# Patient Record
Sex: FEMALE | Race: WHITE | NOT HISPANIC OR LATINO | Employment: UNEMPLOYED | URBAN - METROPOLITAN AREA
[De-identification: names, ages, dates, MRNs, and addresses within clinical notes are randomized per-mention and may not be internally consistent; named-entity substitution may affect disease eponyms.]

---

## 2023-08-14 ENCOUNTER — ROUTINE PRENATAL (OUTPATIENT)
Dept: OBSTETRICS AND GYNECOLOGY | Facility: CLINIC | Age: 18
End: 2023-08-14
Payer: COMMERCIAL

## 2023-08-14 VITALS — WEIGHT: 118.6 LBS | SYSTOLIC BLOOD PRESSURE: 121 MMHG | DIASTOLIC BLOOD PRESSURE: 73 MMHG

## 2023-08-14 DIAGNOSIS — Z34.02 ENCOUNTER FOR SUPERVISION OF NORMAL FIRST PREGNANCY IN SECOND TRIMESTER: Primary | ICD-10-CM

## 2023-08-14 DIAGNOSIS — Z36.86 ENCOUNTER FOR ANTENATAL SCREENING FOR CERVICAL LENGTH: ICD-10-CM

## 2023-08-14 DIAGNOSIS — Z36.89 ENCOUNTER FOR FETAL ANATOMIC SURVEY: ICD-10-CM

## 2023-08-14 DIAGNOSIS — Z36.1 ENCOUNTER FOR ANTENATAL SCREENING FOR HIGH ALPHA-FETOPROTEIN LEVEL: ICD-10-CM

## 2023-08-14 RX ORDER — AMOXICILLIN 875 MG/1
875 TABLET, COATED ORAL
COMMUNITY
Start: 2023-08-11 | End: 2023-08-21

## 2023-08-14 RX ORDER — ACETAMINOPHEN 500 MG
1000 TABLET ORAL
COMMUNITY
Start: 2023-03-17 | End: 2024-03-16

## 2023-08-14 NOTE — PROGRESS NOTES
Chief Complaint   Patient presents with    Routine Prenatal Visit     HPI- Pt is 17 y.o.  at 19w4d here for prenatal visit.  Patient has no complaints today.  She is starting to feel fetal movement.    ROS-     - No vaginal bleeding    GI- No abdominal pain    /73   Wt 53.8 kg (118 lb 9.6 oz)   LMP 2023   Exam - See flow sheet    Fetal heart rate is normal    Assessment-  Diagnoses and all orders for this visit:    Encounter for supervision of normal first pregnancy in second trimester    Encounter for  screening for high alpha-fetoprotein level  -     Alpha Fetoprotein, Maternal    Encounter for fetal anatomic survey  -     US Ob 14 + Weeks Single or First Gestation; Future    Encounter for  screening for cervical length  -     US Ob Transvaginal; Future    Other orders  -     amoxicillin (AMOXIL) 875 MG tablet; Take 1 tablet by mouth.  -     acetaminophen (TYLENOL) 500 MG tablet; Take 2 tablets by mouth.    Patient was offered screening for spina bifida and desires.  She will follow-up next week with anatomy ultrasound and see me back in 4 weeks.

## 2023-08-16 LAB
AFP INTERP SERPL-IMP: NORMAL
AFP INTERP SERPL-IMP: NORMAL
AFP MOM SERPL: 0.66
AFP SERPL-MCNC: 41.6 NG/ML
AGE AT DELIVERY: 20.2 YR
GA METHOD: NORMAL
GA: 19.4 WEEKS
IDDM PATIENT QL: NORMAL
LABORATORY COMMENT REPORT: NORMAL
MULTIPLE PREGNANCY: NORMAL
NEURAL TUBE DEFECT RISK FETUS: NORMAL %
RESULT: NORMAL

## 2023-09-12 ENCOUNTER — REFERRAL TRIAGE (OUTPATIENT)
Dept: LABOR AND DELIVERY | Facility: HOSPITAL | Age: 18
End: 2023-09-12
Payer: COMMERCIAL

## 2023-09-13 ENCOUNTER — ROUTINE PRENATAL (OUTPATIENT)
Dept: OBSTETRICS AND GYNECOLOGY | Facility: CLINIC | Age: 18
End: 2023-09-13
Payer: COMMERCIAL

## 2023-09-13 VITALS — SYSTOLIC BLOOD PRESSURE: 113 MMHG | DIASTOLIC BLOOD PRESSURE: 69 MMHG | WEIGHT: 125.4 LBS

## 2023-09-13 DIAGNOSIS — Z34.02 ENCOUNTER FOR SUPERVISION OF NORMAL FIRST PREGNANCY IN SECOND TRIMESTER: Primary | ICD-10-CM

## 2023-09-13 DIAGNOSIS — Z13.0 SCREENING FOR IRON DEFICIENCY ANEMIA: ICD-10-CM

## 2023-09-13 DIAGNOSIS — Z13.1 SCREENING FOR DIABETES MELLITUS: ICD-10-CM

## 2023-09-13 DIAGNOSIS — Z3A.23 23 WEEKS GESTATION OF PREGNANCY: ICD-10-CM

## 2023-09-13 NOTE — PROGRESS NOTES
Chief Complaint   Patient presents with    Routine Prenatal Visit     HPI- Pt is 17 y.o.  at 23w6d here for prenatal visit.  She is doing well with no complaints today.  She is feeling lots of fetal movement.    ROS-     - No vaginal bleeding    GI- No abdominal pain    /69   Wt 56.9 kg (125 lb 6.4 oz)   LMP 2023   Exam - See flow sheet    Fetal heart rate is normal    Assessment-  Diagnoses and all orders for this visit:    Encounter for supervision of normal first pregnancy in second trimester    Screening for diabetes mellitus  -     Gestational Screen 1 Hr ([facility])    Screening for iron deficiency anemia  -     CBC (No Diff)    23 weeks gestation of pregnancy    I discussed prenatal classes and hospital tours.  Discussed the glucose test with her next visit and instructions given.  She will follow-up in 4 weeks.

## 2023-09-14 ENCOUNTER — PATIENT OUTREACH (OUTPATIENT)
Dept: LABOR AND DELIVERY | Facility: HOSPITAL | Age: 18
End: 2023-09-14
Payer: COMMERCIAL

## 2023-09-14 NOTE — OUTREACH NOTE
[facility]  Enrollment    Current Estimated Gestational Age: 24w0d    Questions/Answers      Flowsheet Row Responses   Would like to participate? Yes   Date of Intake Visit 09/20/23          Pt interested but at work currently. Asked for intake to be completed next Wed on her off day.     Airam Celeste RN  Maternity Nurse Navigator    9/14/2023, 11:36 EDT

## 2023-09-20 ENCOUNTER — PATIENT OUTREACH (OUTPATIENT)
Dept: LABOR AND DELIVERY | Facility: HOSPITAL | Age: 18
End: 2023-09-20
Payer: COMMERCIAL

## 2023-09-20 NOTE — OUTREACH NOTE
[facility]  Unable to Reach       Questions/Answers      Flowsheet Row Responses   Pending Outreach Intake Visit   Call Attempt Second   Outcome Left message   Next Call Attempt Date 09/22/23            Airam WHITE - RN  Maternity Nurse Navigator    9/20/2023, 12:46 EDT

## 2023-09-22 ENCOUNTER — PATIENT OUTREACH (OUTPATIENT)
Dept: LABOR AND DELIVERY | Facility: HOSPITAL | Age: 18
End: 2023-09-22
Payer: COMMERCIAL

## 2023-09-22 NOTE — OUTREACH NOTE
[facility]  Unable to Reach       Questions/Answers      Flowsheet Row Responses   Pending Outreach Intake Visit   Call Attempt Third   Outcome Left message   Next Call Attempt Date 09/27/23            Airam WHITE - RN  Maternity Nurse Navigator    9/22/2023, 11:25 EDT

## 2023-09-23 ENCOUNTER — TELEPHONE (OUTPATIENT)
Dept: OBSTETRICS AND GYNECOLOGY | Facility: CLINIC | Age: 18
End: 2023-09-23
Payer: COMMERCIAL

## 2023-09-23 NOTE — TELEPHONE ENCOUNTER
Patient called answering service to speak with physician on call.  She reports rib pain for one week.  She did not seek advice from her primary OB.  She uses Tylenol.  Yesterday, she had some vomiting and reports pain worse afterward.  Baby is moving good.  No fevers or chills.  No bleeding.  No cramping.  Advised observation unless bleeding, cramping or progressive pain.

## 2023-09-27 ENCOUNTER — PATIENT OUTREACH (OUTPATIENT)
Dept: LABOR AND DELIVERY | Facility: HOSPITAL | Age: 18
End: 2023-09-27
Payer: COMMERCIAL

## 2023-09-27 NOTE — OUTREACH NOTE
[facility]  Unable to Reach       Questions/Answers      Flowsheet Row Responses   Pending Outreach Intake Visit   Call Attempt Third   Outcome Left message   Next Call Attempt Date 09/29/23            Airam WHITE - RN  Maternity Nurse Navigator    9/27/2023, 11:15 EDT

## 2023-09-29 ENCOUNTER — PATIENT MESSAGE (OUTPATIENT)
Dept: OBSTETRICS AND GYNECOLOGY | Facility: CLINIC | Age: 18
End: 2023-09-29
Payer: COMMERCIAL

## 2023-09-29 ENCOUNTER — PATIENT OUTREACH (OUTPATIENT)
Dept: LABOR AND DELIVERY | Facility: HOSPITAL | Age: 18
End: 2023-09-29
Payer: COMMERCIAL

## 2023-09-29 NOTE — OUTREACH NOTE
[facility]  Unable to Reach       Questions/Answers      Flowsheet Row Responses   Pending Outreach Intake Visit   Outcome Left message, MyChart message sent to patient            Airam S Roula RN  Maternity Nurse Navigator    9/29/2023, 11:38 EDT

## 2023-10-02 NOTE — TELEPHONE ENCOUNTER
Left message for patient to call office. We need the specific days she was off that she is needing a note for work. If multiple days, patient may consider FMLA per Dr. Mandujano.

## 2023-10-09 ENCOUNTER — TELEPHONE (OUTPATIENT)
Dept: OBSTETRICS AND GYNECOLOGY | Facility: CLINIC | Age: 18
End: 2023-10-09
Payer: COMMERCIAL

## 2023-10-09 NOTE — TELEPHONE ENCOUNTER
Pt called says she won't be able to come to her appt tomorrow and requested if she can be seen on Friday 20th. I did see your completely booked, is there another day you can see her?

## 2023-10-09 NOTE — TELEPHONE ENCOUNTER
You may schedule her with me the next week, after the 20th, but we are unable to see her on the 20th.

## 2023-10-27 ENCOUNTER — ROUTINE PRENATAL (OUTPATIENT)
Dept: OBSTETRICS AND GYNECOLOGY | Facility: CLINIC | Age: 18
End: 2023-10-27
Payer: COMMERCIAL

## 2023-10-27 VITALS — WEIGHT: 134.4 LBS | DIASTOLIC BLOOD PRESSURE: 70 MMHG | SYSTOLIC BLOOD PRESSURE: 112 MMHG

## 2023-10-27 DIAGNOSIS — O26.893 RIGHT UPPER QUADRANT ABDOMINAL PAIN AFFECTING PREGNANCY IN THIRD TRIMESTER: Primary | ICD-10-CM

## 2023-10-27 DIAGNOSIS — R10.11 RIGHT UPPER QUADRANT ABDOMINAL PAIN AFFECTING PREGNANCY IN THIRD TRIMESTER: Primary | ICD-10-CM

## 2023-10-27 DIAGNOSIS — Z3A.30 30 WEEKS GESTATION OF PREGNANCY: ICD-10-CM

## 2023-10-27 RX ORDER — FLUTICASONE PROPIONATE 50 MCG
1 SPRAY, SUSPENSION (ML) NASAL DAILY
COMMUNITY
Start: 2023-09-30 | End: 2023-10-27

## 2023-10-27 RX ORDER — CETIRIZINE HYDROCHLORIDE 10 MG/1
10 TABLET ORAL DAILY
COMMUNITY
Start: 2023-09-30 | End: 2023-10-27

## 2023-10-27 NOTE — PROGRESS NOTES
Chief Complaint   Patient presents with    Routine Prenatal Visit     HPI- Pt is 18 y.o.  at 30w1d here for prenatal visit.  She overall has been feeling well, but does report right upper quadrant pain that she does notice with eating.  She denies any previous history of gallbladder disease.  She does report good fetal movement.    ROS-     - No vaginal bleeding    GI- No abdominal pain    /70   Wt 61 kg (134 lb 6.4 oz)   LMP 2023   Exam - See flow sheet    Fetal heart rate is normal    Assessment-  Diagnoses and all orders for this visit:    Right upper quadrant abdominal pain affecting pregnancy in third trimester  -     Comprehensive Metabolic Panel  -     Amylase  -     Lipase  -     US Ob Follow Up Transabdominal Approach; Future    30 weeks gestation of pregnancy    Other orders  -     Discontinue: fluticasone (FLONASE) 50 MCG/ACT nasal spray; 1 spray into the nostril(s) as directed by provider Daily.  -     Discontinue: cetirizine (zyrTEC) 10 MG tablet; Take 1 tablet by mouth Daily.      She is doing her glucose test today along with CBC.  We will add on CMP and amylase and lipase due to complaints of right upper quadrant pain with eating.  I did discuss with patient that there is a higher incidence of gallbladder disease in pregnancy and recommend avoiding high-fat meals.  She will follow-up in 2 weeks with growth ultrasound.

## 2023-10-28 LAB
ALBUMIN SERPL-MCNC: 3.5 G/DL (ref 3.5–5.2)
ALBUMIN/GLOB SERPL: 1.8 G/DL
ALP SERPL-CCNC: 79 U/L (ref 39–117)
ALT SERPL-CCNC: 12 U/L (ref 1–33)
AMYLASE SERPL-CCNC: 35 U/L (ref 28–100)
AST SERPL-CCNC: 13 U/L (ref 1–32)
BILIRUB SERPL-MCNC: 0.3 MG/DL (ref 0–1.2)
BUN SERPL-MCNC: 4 MG/DL (ref 6–20)
BUN/CREAT SERPL: 9.1 (ref 7–25)
CALCIUM SERPL-MCNC: 8.1 MG/DL (ref 8.6–10.5)
CHLORIDE SERPL-SCNC: 105 MMOL/L (ref 98–107)
CO2 SERPL-SCNC: 23.8 MMOL/L (ref 22–29)
CREAT SERPL-MCNC: 0.44 MG/DL (ref 0.57–1)
EGFRCR SERPLBLD CKD-EPI 2021: 142.2 ML/MIN/1.73
ERYTHROCYTE [DISTWIDTH] IN BLOOD BY AUTOMATED COUNT: 11.5 % (ref 12.3–15.4)
GLOBULIN SER CALC-MCNC: 1.9 GM/DL
GLUCOSE 1H P 50 G GLC PO SERPL-MCNC: 79 MG/DL (ref 65–139)
GLUCOSE SERPL-MCNC: 88 MG/DL (ref 65–99)
HCT VFR BLD AUTO: 32.9 % (ref 34–46.6)
HGB BLD-MCNC: 10.8 G/DL (ref 12–15.9)
LIPASE SERPL-CCNC: 16 U/L (ref 13–60)
MCH RBC QN AUTO: 27.9 PG (ref 26.6–33)
MCHC RBC AUTO-ENTMCNC: 32.8 G/DL (ref 31.5–35.7)
MCV RBC AUTO: 85 FL (ref 79–97)
PLATELET # BLD AUTO: 223 10*3/MM3 (ref 140–450)
POTASSIUM SERPL-SCNC: 3.6 MMOL/L (ref 3.5–5.2)
PROT SERPL-MCNC: 5.4 G/DL (ref 6–8.5)
RBC # BLD AUTO: 3.87 10*6/MM3 (ref 3.77–5.28)
SODIUM SERPL-SCNC: 138 MMOL/L (ref 136–145)
WBC # BLD AUTO: 7.73 10*3/MM3 (ref 3.4–10.8)

## 2023-10-30 PROBLEM — O99.019 MATERNAL ANEMIA IN PREGNANCY, ANTEPARTUM: Status: ACTIVE | Noted: 2023-10-30

## 2023-10-30 RX ORDER — FERROUS SULFATE 325(65) MG
325 TABLET ORAL
Qty: 90 TABLET | Refills: 1 | Status: SHIPPED | OUTPATIENT
Start: 2023-10-30 | End: 2023-11-02 | Stop reason: SDUPTHER

## 2023-11-02 ENCOUNTER — TELEPHONE (OUTPATIENT)
Dept: OBSTETRICS AND GYNECOLOGY | Facility: CLINIC | Age: 18
End: 2023-11-02
Payer: COMMERCIAL

## 2023-11-02 RX ORDER — FERROUS SULFATE 325(65) MG
325 TABLET ORAL
Qty: 90 TABLET | Refills: 1 | Status: SHIPPED | OUTPATIENT
Start: 2023-11-02

## 2023-11-02 NOTE — TELEPHONE ENCOUNTER
Nazia pt- calling says she wants her Rx sent over to [facility] instead, that is now updated in her pharmacy. Please Advise.

## 2023-11-15 ENCOUNTER — ROUTINE PRENATAL (OUTPATIENT)
Dept: OBSTETRICS AND GYNECOLOGY | Facility: CLINIC | Age: 18
End: 2023-11-15
Payer: COMMERCIAL

## 2023-11-15 VITALS — WEIGHT: 138.8 LBS | DIASTOLIC BLOOD PRESSURE: 68 MMHG | SYSTOLIC BLOOD PRESSURE: 103 MMHG

## 2023-11-15 DIAGNOSIS — O99.019 MATERNAL ANEMIA IN PREGNANCY, ANTEPARTUM: Primary | ICD-10-CM

## 2023-11-15 DIAGNOSIS — Z3A.32 32 WEEKS GESTATION OF PREGNANCY: ICD-10-CM

## 2023-11-15 PROBLEM — Z86.19 HISTORY OF HERPES GENITALIS: Status: ACTIVE | Noted: 2023-11-15

## 2023-11-15 NOTE — PROGRESS NOTES
Chief Complaint   Patient presents with    Routine Prenatal Visit     HPI- Pt is 18 y.o.  at 32w6d here for prenatal visit.  She is doing well today and has no major complaints.  She does report good fetal movement.    ROS-     - No vaginal bleeding    GI- No abdominal pain    /68   Wt 63 kg (138 lb 12.8 oz)   LMP 2023   Exam - See flow sheet    Fetal heart rate is normal    Assessment-  Diagnoses and all orders for this visit:    Maternal anemia in pregnancy, antepartum    32 weeks gestation of pregnancy    Growth ultrasound was performed and shows appropriate fetal growth.  Ultrasound was reviewed with her.  She will follow-up in 2 weeks.

## 2023-11-16 ENCOUNTER — TELEPHONE (OUTPATIENT)
Dept: OBSTETRICS AND GYNECOLOGY | Facility: CLINIC | Age: 18
End: 2023-11-16
Payer: COMMERCIAL

## 2023-11-16 NOTE — TELEPHONE ENCOUNTER
States she was sick and started taking iron on 11/2/23. She was lightheaded and dizzy. She feels better now but needs a note for those days. Thank you

## 2023-11-16 NOTE — TELEPHONE ENCOUNTER
MANJU quevedo. Nazia pt 33 wks. OB 12/1/23. Seen 11/15/23 OB with US. Needing work note for 11/15/23 and the 8th, 9th and 10th. Please advise if ok for notes. Thank you.

## 2023-11-16 NOTE — TELEPHONE ENCOUNTER
Caller: Mirta Pierce    Relationship: Self    Best call back number: [phone]      Who are you requesting to speak with (clinical staff, DR. RUANO      What was the call regarding: PATIENT ADVISED THAT SHE WOULD NEED A WORK NOTE FOR 11/15/23.  PATIENT ADVISED THAT SHE CALLED OFF FROM WORK ON THE  8TH, 9TH AND 10TH AND WOULD  LIKE A NOTE FOR THOSE DAYS AS WELL. SHE WILL  THE DRS. NOTES.

## 2023-11-20 ENCOUNTER — HOSPITAL ENCOUNTER (EMERGENCY)
Facility: HOSPITAL | Age: 18
Discharge: HOME | End: 2023-11-20
Attending: OBSTETRICS & GYNECOLOGY | Admitting: OBSTETRICS & GYNECOLOGY
Payer: COMMERCIAL

## 2023-11-20 VITALS
TEMPERATURE: 97.9 F | HEART RATE: 80 BPM | RESPIRATION RATE: 18 BRPM | OXYGEN SATURATION: 100 % | WEIGHT: 137 LBS | BODY MASS INDEX: 25.21 KG/M2 | SYSTOLIC BLOOD PRESSURE: 95 MMHG | DIASTOLIC BLOOD PRESSURE: 53 MMHG | HEIGHT: 62 IN

## 2023-11-20 LAB
ALBUMIN SERPL-MCNC: 3.5 G/DL (ref 3.5–5.2)
ALBUMIN/GLOB SERPL: 1.5 G/DL
ALP SERPL-CCNC: 109 U/L (ref 39–117)
ALT SERPL W P-5'-P-CCNC: 10 U/L (ref 1–33)
ANION GAP SERPL CALCULATED.3IONS-SCNC: 12.5 MMOL/L (ref 5–15)
AST SERPL-CCNC: 17 U/L (ref 1–32)
BACTERIA UR QL AUTO: ABNORMAL /HPF
BILIRUB SERPL-MCNC: 0.4 MG/DL (ref 0–1.2)
BILIRUB UR QL STRIP: NEGATIVE
BUN SERPL-MCNC: 5 MG/DL (ref 6–20)
BUN/CREAT SERPL: 12.2 (ref 7–25)
CALCIUM SPEC-SCNC: 8.2 MG/DL (ref 8.6–10.5)
CHLORIDE SERPL-SCNC: 106 MMOL/L (ref 98–107)
CLARITY UR: ABNORMAL
CO2 SERPL-SCNC: 18.5 MMOL/L (ref 22–29)
COLOR UR: YELLOW
CREAT SERPL-MCNC: 0.41 MG/DL (ref 0.57–1)
EGFRCR SERPLBLD CKD-EPI 2021: 144.7 ML/MIN/1.73
GLOBULIN UR ELPH-MCNC: 2.4 GM/DL
GLUCOSE SERPL-MCNC: 80 MG/DL (ref 65–99)
GLUCOSE UR STRIP-MCNC: NEGATIVE MG/DL
HGB UR QL STRIP.AUTO: NEGATIVE
HYALINE CASTS UR QL AUTO: ABNORMAL /LPF
KETONES UR QL STRIP: ABNORMAL
LEUKOCYTE ESTERASE UR QL STRIP.AUTO: ABNORMAL
NITRITE UR QL STRIP: NEGATIVE
PH UR STRIP.AUTO: 6.5 [PH] (ref 5–8)
POTASSIUM SERPL-SCNC: 3.5 MMOL/L (ref 3.5–5.2)
PROT SERPL-MCNC: 5.9 G/DL (ref 6–8.5)
PROT UR QL STRIP: NEGATIVE
RBC # UR STRIP: ABNORMAL /HPF
REF LAB TEST METHOD: ABNORMAL
SODIUM SERPL-SCNC: 137 MMOL/L (ref 136–145)
SP GR UR STRIP: 1.02 (ref 1–1.03)
SQUAMOUS #/AREA URNS HPF: ABNORMAL /HPF
UROBILINOGEN UR QL STRIP: ABNORMAL
WBC # UR STRIP: ABNORMAL /HPF

## 2023-11-20 PROCEDURE — 96372 THER/PROPH/DIAG INJ SC/IM: CPT | Performed by: OBSTETRICS & GYNECOLOGY

## 2023-11-20 PROCEDURE — 25010000002 TERBUTALINE PER 1 MG: Performed by: OBSTETRICS & GYNECOLOGY

## 2023-11-20 PROCEDURE — 25810000003 LACTATED RINGERS SOLUTION: Performed by: OBSTETRICS & GYNECOLOGY

## 2023-11-20 PROCEDURE — 59025 FETAL NON-STRESS TEST: CPT

## 2023-11-20 PROCEDURE — 80053 COMPREHEN METABOLIC PANEL: CPT | Performed by: OBSTETRICS & GYNECOLOGY

## 2023-11-20 PROCEDURE — 81001 URINALYSIS AUTO W/SCOPE: CPT | Performed by: OBSTETRICS & GYNECOLOGY

## 2023-11-20 PROCEDURE — 87086 URINE CULTURE/COLONY COUNT: CPT | Performed by: OBSTETRICS & GYNECOLOGY

## 2023-11-20 PROCEDURE — 99284 EMERGENCY DEPT VISIT MOD MDM: CPT | Performed by: OBSTETRICS & GYNECOLOGY

## 2023-11-20 RX ORDER — TERBUTALINE SULFATE 1 MG/ML
0.25 INJECTION, SOLUTION SUBCUTANEOUS ONCE
Status: COMPLETED | OUTPATIENT
Start: 2023-11-20 | End: 2023-11-20

## 2023-11-20 RX ADMIN — SODIUM CHLORIDE, POTASSIUM CHLORIDE, SODIUM LACTATE AND CALCIUM CHLORIDE 1000 ML: 600; 310; 30; 20 INJECTION, SOLUTION INTRAVENOUS at 04:33

## 2023-11-20 RX ADMIN — TERBUTALINE SULFATE 0.25 MG: 1 INJECTION, SOLUTION SUBCUTANEOUS at 06:15

## 2023-11-20 NOTE — OBED NOTES
[facility] Note OB        Patient Name: Mirta Pierce  YOB: 2005  MRN: [id number]  Admission Date: 2023  3:42 AM  Date of Service: 2023    Chief Complaint: Abdominal Cramping (Pt comes into the [facility] with constant abd pain x 2 hours pt states it does get worse every so often and last about 1 min. + fm noted per pt )        Subjective     Mirta Pierce is a 18 y.o. female  at 33w4d with Estimated Date of Delivery: 24 who presents with the chief complaint listed above.  The abdominal pain is constant and intermittently severe.  At its worst the patient rates the pain 8/10.  In [facility], the toco shows frequent contractions. The pain started at about 0200 this morning. She sees Halle Mandujano MD for her prenatal care. Her pregnancy has been complicated by:  anemia and a history of genital HSV.    She describes fetal movement as normal.  She denies rupture of membranes.  She denies vaginal bleeding. She is feeling contractions.          Objective   Patient Active Problem List    Diagnosis     History of herpes genitalis [Z86.19]     Maternal anemia in pregnancy, antepartum [O99.019]         OB History    Para Term  AB Living   1 0 0 0 0 0   SAB IAB Ectopic Molar Multiple Live Births   0 0 0 0 0 0      # Outcome Date GA Lbr Tan/2nd Weight Sex Delivery Anes PTL Lv   1 Current                 Past Medical History:   Diagnosis Date    Herpes        Past Surgical History:   Procedure Laterality Date    EAR TUBES      FOOT NAVICULAR EXCISION OR BONE GRAFT      WISDOM TOOTH EXTRACTION         No current facility-administered medications on file prior to encounter.     Current Outpatient Medications on File Prior to Encounter   Medication Sig Dispense Refill    acetaminophen (TYLENOL) 500 MG tablet Take 2 tablets by mouth.      ferrous sulfate 325 (65 FE) MG tablet Take 1 tablet by mouth Daily With Breakfast. 90 tablet 1    Prenatal Vit-Fe Fumarate-FA (prenatal vitamin 27-0.8)  "27-0.8 MG tablet tablet Take  by mouth Daily.         No Known Allergies    Family History   Problem Relation Age of Onset    Breast cancer Paternal Grandmother        Social History     Socioeconomic History    Marital status: Single   Tobacco Use    Smoking status: Never    Smokeless tobacco: Never   Vaping Use    Vaping Use: Never used   Substance and Sexual Activity    Alcohol use: Never    Drug use: Never    Sexual activity: Yes           Review of Systems   HEENT: negative  Pulmonary: negative  CV: negative  Abdomen: abdominal pain  : negative  Musculoskeletal: negative  Neuro: negative    PHYSICAL EXAM:      VITAL SIGNS:  Vitals:    11/20/23 0346 11/20/23 0405   BP:  105/69   BP Location:  Right arm   Patient Position:  Sitting   Pulse:  90   Resp:  18   Temp:  97.9 °F (36.6 °C)   TempSrc:  Oral   SpO2:  100%   Weight: 62.1 kg (137 lb)    Height:  157.5 cm (62\")        FHT'S:                   Baseline:  140 BPM  Variability:  Moderate = 6 - 25 BPM  Accelerations:  15 x 15 accelerations present     Decelerations:  absent  Contractions:   present     Interpretation:    Reactive NST, CAT 1 tracing        PHYSICAL EXAM:    General: well developed; well nourished  no acute distress   Heart: Not performed.   Lungs  : breathing is unlabored     Abdomen: soft, non-tender; no masses       Cervix: was checked (by RN): 1 cm / 30 % / -2  Cervical Dilation (cm): 1  Cervical Effacement: 30%  Fetal Station: -2  Cervical Consistency: medium  Cervical Position: posterior   Contractions: every 2 minutes        Extremities: peripheral pulses normal, no pedal edema, no clubbing or cyanosis      LABS AND TESTING ORDERED:  Uterine and fetal monitoring  Urinalysis  CBC    LAB RESULTS:    Recent Results (from the past 24 hour(s))   Urinalysis With Culture If Indicated - Urine, Clean Catch    Collection Time: 11/20/23  4:11 AM    Specimen: Urine, Clean Catch   Result Value Ref Range    Color, UA Yellow Yellow, Straw    Appearance, " "UA Cloudy (A) Clear    pH, UA 6.5 5.0 - 8.0    Specific Gravity, UA 1.020 1.005 - 1.030    Glucose, UA Negative Negative    Ketones, UA 80 mg/dL (3+) (A) Negative    Bilirubin, UA Negative Negative    Blood, UA Negative Negative    Protein, UA Negative Negative    Leuk Esterase, UA Small (1+) (A) Negative    Nitrite, UA Negative Negative    Urobilinogen, UA 1.0 E.U./dL 0.2 - 1.0 E.U./dL   Urinalysis, Microscopic Only - Urine, Clean Catch    Collection Time: 23  4:11 AM    Specimen: Urine, Clean Catch   Result Value Ref Range    RBC, UA 0-2 None Seen, 0-2 /HPF    WBC, UA 11-20 (A) None Seen, 0-2 /HPF    Bacteria, UA 1+ (A) None Seen /HPF    Squamous Epithelial Cells, UA 13-20 (A) None Seen, 0-2 /HPF    Hyaline Casts, UA 3-6 None Seen /LPF    Methodology Automated Microscopy        Lab Results   Component Value Date    ABO O 2023    RH Positive 2023       No results found for: \"STREPGPB\"              Assessment & Plan     ASSESSMENT/PLAN:  Mirta Pierce is a 18 y.o. female  at 33w4d who presented with: abdominal pain, contractions          Final Impression:  Pregnancy at 33w4d  Reactive NST.  CAT 1 tracing  Contractions  Maternal vital signs were reviewed and were unremarkable              Vitals:    23 0346 23 0405   BP:  105/69   BP Location:  Right arm   Patient Position:  Sitting   Pulse:  90   Resp:  18   Temp:  97.9 °F (36.6 °C)   TempSrc:  Oral   SpO2:  100%   Weight: 62.1 kg (137 lb)    Height:  157.5 cm (62\")       No results found for: \"STREPGPB\"  Lab Results   Component Value Date    ABO O 2023    RH Positive 2023         PLAN:     Patient was given 1 liter of IV fluid.  The FHT was reactive and reassuring.  Her cervix was checked again by the same examiner after 2.5 hours with no cervical change.  However, she still complained of feeling contractions and pressure.  Her blood pressure was too low for Nifedipine so she received one dose of terbutaline after which " her contractions and pressure completely resolved.  The patient had a cervical exam prior to FFN order so no FFN was done.    Patient discharged home with instructions to return to [facility] if her contractions return or if she experiences leaking fluid, vaginal bleeding or decreased fetal movement.  She will keep her scheduled prenatal appointment.    I have spent 30 minutes including face to face time with the patient, greater than 50% in discussion of the diagnosis (counseling) and/or coordination of care.     Emma Ying MD  11/20/2023  04:35 [facility]  OB Hospitalist  Phone:  [phone]

## 2023-11-21 ENCOUNTER — HOSPITAL ENCOUNTER (EMERGENCY)
Facility: HOSPITAL | Age: 18
Discharge: HOME | End: 2023-11-21
Attending: OBSTETRICS & GYNECOLOGY | Admitting: OBSTETRICS & GYNECOLOGY
Payer: COMMERCIAL

## 2023-11-21 VITALS
TEMPERATURE: 98 F | WEIGHT: 137.6 LBS | HEART RATE: 99 BPM | SYSTOLIC BLOOD PRESSURE: 113 MMHG | OXYGEN SATURATION: 99 % | RESPIRATION RATE: 18 BRPM | HEIGHT: 62 IN | DIASTOLIC BLOOD PRESSURE: 70 MMHG | BODY MASS INDEX: 25.32 KG/M2

## 2023-11-21 LAB — BACTERIA SPEC AEROBE CULT: NO GROWTH

## 2023-11-21 PROCEDURE — 99284 EMERGENCY DEPT VISIT MOD MDM: CPT | Performed by: OBSTETRICS & GYNECOLOGY

## 2023-11-21 PROCEDURE — 59025 FETAL NON-STRESS TEST: CPT

## 2023-11-21 NOTE — LETTER
November 21, 2023     Patient: Mirta Pierce   YOB: 2005   Date of Visit: 11/20/2023       To Whom It May Concern:    It is my medical opinion that Mirta Pierce may return to work on 11/22/2023 .           Sincerely,

## 2023-11-21 NOTE — OBED NOTES
"[facility] [place]    Patient Name: Mirta Pierce  YOB: 2005  MRN: [id number]  Admission Date: 2023  4:57 AM  Date of Service: 2023    Chief Complaint: Contractions (Pt comes into the [facility] c/o of \"coming and going pain\" and \"constant pain\" + fm  noted pt denies any bleeding or lof)        Subjective     Mirta Pierce is a 18 y.o. female  at 33w5d with Estimated Date of Delivery: 24 who presents with the chief complaint listed above. Pt seen in triage yesterday and sent home post IVFs and terbutaline. Pt reports felt better till about midnight last night then was woke up by painful contractions q 10 mins.She was  when seen yesterday     She sees Halle Mandujano MD for her prenatal care. Her pregnancy has been complicated by:  RNI,anemia, HSV    She describes fetal movement as normal.  She denies rupture of membranes.  She denies vaginal bleeding. She is feeling contractions.        Objective   Patient Active Problem List    Diagnosis     History of herpes genitalis [Z86.19]     Maternal anemia in pregnancy, antepartum [O99.019]         OB History    Para Term  AB Living   1 0 0 0 0 0   SAB IAB Ectopic Molar Multiple Live Births   0 0 0 0 0 0      # Outcome Date GA Lbr Tan/2nd Weight Sex Delivery Anes PTL Lv   1 Current                 Past Medical History:   Diagnosis Date    Herpes        Past Surgical History:   Procedure Laterality Date    EAR TUBES      FOOT NAVICULAR EXCISION OR BONE GRAFT      WISDOM TOOTH EXTRACTION         Current Facility-Administered Medications on File Prior to Encounter   Medication Dose Route Frequency Provider Last Rate Last Admin    [COMPLETED] terbutaline (BRETHINE) injection 0.25 mg  0.25 mg Subcutaneous Once Emma Ying MD   0.25 mg at 23 0615     Current Outpatient Medications on File Prior to Encounter   Medication Sig Dispense Refill    acetaminophen (TYLENOL) 500 MG tablet Take 2 tablets by mouth.      ferrous " "sulfate 325 (65 FE) MG tablet Take 1 tablet by mouth Daily With Breakfast. 90 tablet 1    Prenatal Vit-Fe Fumarate-FA (prenatal vitamin 27-0.8) 27-0.8 MG tablet tablet Take  by mouth Daily.         No Known Allergies    Family History   Problem Relation Age of Onset    Breast cancer Paternal Grandmother        Social History     Socioeconomic History    Marital status: Single   Tobacco Use    Smoking status: Never    Smokeless tobacco: Never   Vaping Use    Vaping Use: Never used   Substance and Sexual Activity    Alcohol use: Never    Drug use: Never    Sexual activity: Yes           Review of Systems   Constitutional:  Negative for chills and fever.   HENT: Negative.     Eyes:  Negative for photophobia and visual disturbance.   Respiratory:  Negative for shortness of breath.    Cardiovascular:  Negative for chest pain.   Gastrointestinal:  Positive for abdominal pain. Negative for nausea.   Psychiatric/Behavioral:  The patient is not nervous/anxious.           PHYSICAL EXAM:      VITAL SIGNS:  Vitals:    11/21/23 0509 11/21/23 0510   BP: 113/70 113/70   Pulse: 98 99   Resp: 18    Temp: 98 °F (36.7 °C)    TempSrc: Oral    SpO2: 99%    Weight:  62.4 kg (137 lb 9.6 oz)   Height: 157.5 cm (62\")             FHT'S:                       Baseline:         Fetal HR Baseline: normal range                   Beats/min:       Fetal HR (beats/min): 135        Variability:        Fetal HR Variability: moderate (amplitude range 6 to 25 bpm)  Accelerations:  Fetal HR Accelerations: greater than/equal to 15 bpm, lasting at least 15 seconds  Decelerations:  Fetal HR Decelerations: absent      Interpretation:  reassuring and category 1                                     PHYSICAL EXAM:      General: well developed; well nourished  no acute distress   Heart: Not performed.   Lungs   breathing is unlabored   Abdomen: Gravid and non tender     Extremities: trace edema, DTRs 1 plus, no clonus       Cervix: Per RN  Cervical Dilation " "(cm): 1  Cervical Effacement: 30%  Fetal Station: -2  Cervical Consistency: medium  Cervical Position: posterior     Contractions:   irregular                    LABS AND TESTING ORDERED:  Uterine and fetal monitoring  Urinalysis  Serial cervical exams    LAB RESULTS:    No results found for this or any previous visit (from the past 24 hour(s)).    Lab Results   Component Value Date    ABO O 07/05/2023    RH Positive 07/05/2023       No results found for: \"STREPGPB\"              External Prenatal Results       Pregnancy Outside Results - Transcribed From Office Records - See Scanned Records For Details       Test Value Date Time    ABO  O  07/05/23 1448    Rh  Positive  07/05/23 1448    Antibody Screen  Negative  07/05/23 1448      ^ Negative  05/26/23 1248    Varicella IgG ^ 1.1 AI 05/26/23 1248    Rubella  <0.90 index 07/05/23 1448    Hgb  10.8 g/dL 10/27/23 1516       13.5 g/dL 07/05/23 1448      ^ 14.3 g/dL 05/26/23 1248    Hct  32.9 % 10/27/23 1516       40.2 % 07/05/23 1448      ^ 42.9 % 05/26/23 1248    Glucose Fasting GTT       Glucose Tolerance Test 1 hour       Glucose Tolerance Test 3 hour       Gonorrhea (discrete)  Negative  07/05/23 1509    Chlamydia (discrete)  Negative  07/05/23 1509    RPR  Non Reactive  07/05/23 1448    VDRL       Syphilis Antibody ^ <0.2 AI 05/26/23 1248    HBsAg  Negative  07/05/23 1448      ^ Nonreactive  05/26/23 1248    Herpes Simplex Virus PCR       Herpes Simplex VIrus Culture       HIV  Non Reactive  07/05/23 1448      ^ Nonreactive  05/26/23 1248    Hep C RNA Quant PCR       Hep C Antibody  Non Reactive  07/05/23 1448      ^ Nonreactive  05/26/23 1248    AFP  41.6 ng/mL 08/14/23 1311    Group B Strep       GBS Susceptibility to Clindamycin       GBS Susceptibility to Erythromycin       Fetal Fibronectin       Genetic Testing, Maternal Blood                 Drug Screening       Test Value Date Time    Urine Drug Screen       Amphetamine Screen  Negative ng/mL 07/05/23 1509 "    Barbiturate Screen  Negative ng/mL 23 1509    Benzodiazepine Screen  Negative ng/mL 23 1509    Methadone Screen  Negative ng/mL 23 1509    Phencyclidine Screen  Negative ng/mL 23 1509    Opiates Screen       THC Screen       Cocaine Screen       Propoxyphene Screen  Negative ng/mL 23 1509    Buprenorphine Screen       Methamphetamine Screen       Oxycodone Screen       Tricyclic Antidepressants Screen                 Legend    ^: Historical                              Impression:   @ 33w5d .  Final Diagnosis: benson lu    Plan:  1. Discharge to home.    2. Plan of care has been reviewed with patient along with risks, benefits of treatment.   All questions have been answered. Call health care provider for any further concerns and keep office appointments.  3. PTL precautions, pelvic rest and light activity till 36 weeks, comfort measures      I discussed the patients findings and my recommendations with patient, family, and nursing staff      Teri Rios MD  2023  06:03 EST

## 2023-12-05 ENCOUNTER — TELEPHONE (OUTPATIENT)
Dept: OBSTETRICS AND GYNECOLOGY | Facility: CLINIC | Age: 18
End: 2023-12-05

## 2023-12-05 NOTE — TELEPHONE ENCOUNTER
Caller: Mirta Pierce    Relationship to patient: Self    Best call back number: [phone] / LVM    Chief complaint: NA    Type of visit: OB FOLLOW    Requested date: NEXT AVAILABLE    If rescheduling, when is the original appointment: 12/05/23    Additional notes: PT HAD OB F/U APPT TODAY AND NEEDS TO R/S DUE TO INSURANCE EXPIRING - PT R/S FOR 12/12/23    PLEASE CALL PT WITH ANY QUESTIONS    THANK YOU!

## 2023-12-12 ENCOUNTER — HOSPITAL ENCOUNTER (OUTPATIENT)
Facility: HOSPITAL | Age: 18
Setting detail: OBSERVATION
LOS: 1 days | Discharge: HOME | End: 2023-12-13
Attending: OBSTETRICS & GYNECOLOGY | Admitting: OBSTETRICS & GYNECOLOGY
Payer: COMMERCIAL

## 2023-12-12 ENCOUNTER — ROUTINE PRENATAL (OUTPATIENT)
Dept: OBSTETRICS AND GYNECOLOGY | Facility: CLINIC | Age: 18
End: 2023-12-12
Payer: COMMERCIAL

## 2023-12-12 VITALS — WEIGHT: 139.2 LBS | DIASTOLIC BLOOD PRESSURE: 63 MMHG | BODY MASS INDEX: 25.46 KG/M2 | SYSTOLIC BLOOD PRESSURE: 102 MMHG

## 2023-12-12 DIAGNOSIS — Z36.85 ANTENATAL SCREENING FOR STREPTOCOCCUS B: ICD-10-CM

## 2023-12-12 DIAGNOSIS — Z86.19 HISTORY OF HERPES GENITALIS: ICD-10-CM

## 2023-12-12 DIAGNOSIS — Z3A.36 36 WEEKS GESTATION OF PREGNANCY: ICD-10-CM

## 2023-12-12 DIAGNOSIS — O36.8130 DECREASED FETAL MOVEMENTS IN THIRD TRIMESTER, SINGLE OR UNSPECIFIED FETUS: Primary | ICD-10-CM

## 2023-12-12 DIAGNOSIS — Z13.89 SCREENING FOR BLOOD OR PROTEIN IN URINE: ICD-10-CM

## 2023-12-12 PROBLEM — Z34.90 PREGNANCY: Status: ACTIVE | Noted: 2023-12-12

## 2023-12-12 LAB
ABO GROUP BLD: NORMAL
ALBUMIN SERPL-MCNC: 3.7 G/DL (ref 3.5–5.2)
ALBUMIN/GLOB SERPL: 1.3 G/DL
ALP SERPL-CCNC: 169 U/L (ref 39–117)
ALT SERPL W P-5'-P-CCNC: 11 U/L (ref 1–33)
ANION GAP SERPL CALCULATED.3IONS-SCNC: 14.7 MMOL/L (ref 5–15)
AST SERPL-CCNC: 16 U/L (ref 1–32)
BASOPHILS # BLD AUTO: 0.03 10*3/MM3 (ref 0–0.2)
BASOPHILS NFR BLD AUTO: 0.2 % (ref 0–1.5)
BILIRUB SERPL-MCNC: 0.5 MG/DL (ref 0–1.2)
BLD GP AB SCN SERPL QL: NEGATIVE
BUN SERPL-MCNC: 4 MG/DL (ref 6–20)
BUN/CREAT SERPL: 7.8 (ref 7–25)
CALCIUM SPEC-SCNC: 8.6 MG/DL (ref 8.6–10.5)
CHLORIDE SERPL-SCNC: 103 MMOL/L (ref 98–107)
CO2 SERPL-SCNC: 19.3 MMOL/L (ref 22–29)
CREAT SERPL-MCNC: 0.51 MG/DL (ref 0.57–1)
DEPRECATED RDW RBC AUTO: 36 FL (ref 37–54)
EGFRCR SERPLBLD CKD-EPI 2021: 137.2 ML/MIN/1.73
EOSINOPHIL # BLD AUTO: 0.01 10*3/MM3 (ref 0–0.4)
EOSINOPHIL NFR BLD AUTO: 0.1 % (ref 0.3–6.2)
ERYTHROCYTE [DISTWIDTH] IN BLOOD BY AUTOMATED COUNT: 12.9 % (ref 12.3–15.4)
GLOBULIN UR ELPH-MCNC: 2.9 GM/DL
GLUCOSE SERPL-MCNC: 106 MG/DL (ref 65–99)
GLUCOSE UR STRIP-MCNC: NEGATIVE MG/DL
HCT VFR BLD AUTO: 39.2 % (ref 34–46.6)
HGB BLD-MCNC: 12.2 G/DL (ref 12–15.9)
IMM GRANULOCYTES # BLD AUTO: 0.08 10*3/MM3 (ref 0–0.05)
IMM GRANULOCYTES NFR BLD AUTO: 0.6 % (ref 0–0.5)
LYMPHOCYTES # BLD AUTO: 1.27 10*3/MM3 (ref 0.7–3.1)
LYMPHOCYTES NFR BLD AUTO: 10 % (ref 19.6–45.3)
MCH RBC QN AUTO: 24.5 PG (ref 26.6–33)
MCHC RBC AUTO-ENTMCNC: 31.1 G/DL (ref 31.5–35.7)
MCV RBC AUTO: 78.7 FL (ref 79–97)
MONOCYTES # BLD AUTO: 0.47 10*3/MM3 (ref 0.1–0.9)
MONOCYTES NFR BLD AUTO: 3.7 % (ref 5–12)
NEUTROPHILS NFR BLD AUTO: 10.89 10*3/MM3 (ref 1.7–7)
NEUTROPHILS NFR BLD AUTO: 85.4 % (ref 42.7–76)
NRBC BLD AUTO-RTO: 0 /100 WBC (ref 0–0.2)
PLATELET # BLD AUTO: 233 10*3/MM3 (ref 140–450)
PMV BLD AUTO: 10.9 FL (ref 6–12)
POTASSIUM SERPL-SCNC: 3.2 MMOL/L (ref 3.5–5.2)
PROT SERPL-MCNC: 6.6 G/DL (ref 6–8.5)
PROT UR STRIP-MCNC: ABNORMAL MG/DL
RBC # BLD AUTO: 4.98 10*6/MM3 (ref 3.77–5.28)
RH BLD: POSITIVE
SODIUM SERPL-SCNC: 137 MMOL/L (ref 136–145)
T&S EXPIRATION DATE: NORMAL
WBC NRBC COR # BLD AUTO: 12.75 10*3/MM3 (ref 3.4–10.8)

## 2023-12-12 PROCEDURE — 85025 COMPLETE CBC W/AUTO DIFF WBC: CPT | Performed by: OBSTETRICS & GYNECOLOGY

## 2023-12-12 PROCEDURE — 99221 1ST HOSP IP/OBS SF/LOW 40: CPT | Performed by: OBSTETRICS & GYNECOLOGY

## 2023-12-12 PROCEDURE — 86900 BLOOD TYPING SEROLOGIC ABO: CPT | Performed by: OBSTETRICS & GYNECOLOGY

## 2023-12-12 PROCEDURE — 80053 COMPREHEN METABOLIC PANEL: CPT | Performed by: OBSTETRICS & GYNECOLOGY

## 2023-12-12 PROCEDURE — 86850 RBC ANTIBODY SCREEN: CPT | Performed by: OBSTETRICS & GYNECOLOGY

## 2023-12-12 PROCEDURE — 86901 BLOOD TYPING SEROLOGIC RH(D): CPT | Performed by: OBSTETRICS & GYNECOLOGY

## 2023-12-12 PROCEDURE — G0378 HOSPITAL OBSERVATION PER HR: HCPCS

## 2023-12-12 PROCEDURE — G0463 HOSPITAL OUTPT CLINIC VISIT: HCPCS

## 2023-12-12 RX ORDER — ACYCLOVIR 400 MG/1
400 TABLET ORAL 3 TIMES DAILY
Qty: 60 TABLET | Refills: 1 | Status: SHIPPED | OUTPATIENT
Start: 2023-12-12 | End: 2023-12-16 | Stop reason: HOSPADM

## 2023-12-12 RX ORDER — SODIUM CHLORIDE 0.9 % (FLUSH) 0.9 %
10 SYRINGE (ML) INJECTION AS NEEDED
Status: DISCONTINUED | OUTPATIENT
Start: 2023-12-12 | End: 2023-12-13 | Stop reason: HOSPADM

## 2023-12-12 RX ORDER — SODIUM CHLORIDE 0.9 % (FLUSH) 0.9 %
10 SYRINGE (ML) INJECTION EVERY 12 HOURS SCHEDULED
Status: DISCONTINUED | OUTPATIENT
Start: 2023-12-12 | End: 2023-12-13 | Stop reason: HOSPADM

## 2023-12-12 RX ADMIN — Medication 10 ML: at 21:29

## 2023-12-12 NOTE — PLAN OF CARE
Problem: Adult Inpatient Plan of Care  Goal: Plan of Care Review  Outcome: Ongoing, Progressing  Flowsheets (Taken 12/12/2023 1833)  Progress: declining  Plan of Care Reviewed With: patient  Outcome Evaluation: Pt states she is not feeling movement from baby. In obs for BPP 4/8. Dr Aragon wishes to monitor pt more to see if strip becomes reactive. IV placed and labs sent as a precaution. Denies VB, LOF. Pt ctx ever 2-3 minutes. FOB at bedside.  Goal: Patient-Specific Goal (Individualized)  Outcome: Ongoing, Progressing  Goal: Absence of Hospital-Acquired Illness or Injury  Outcome: Ongoing, Progressing  Intervention: Identify and Manage Fall Risk  Recent Flowsheet Documentation  Taken 12/12/2023 1800 by Samuel Ly RN  Safety Promotion/Fall Prevention: safety round/check completed  Goal: Optimal Comfort and Wellbeing  Outcome: Ongoing, Progressing  Goal: Readiness for Transition of Care  Outcome: Ongoing, Progressing   Goal Outcome Evaluation:  Plan of Care Reviewed With: patient        Progress: declining  Outcome Evaluation: Pt states she is not feeling movement from baby. In obs for BPP 4/8. Dr Aragon wishes to monitor pt more to see if strip becomes reactive. IV placed and labs sent as a precaution. Denies VB, LOF. Pt ctx ever 2-3 minutes. FOB at bedside.

## 2023-12-12 NOTE — PROGRESS NOTES
Chief Complaint   Patient presents with    Routine Prenatal Visit     HPI- Pt is 18 y.o.  at 36w5d here for prenatal visit.  Patient reports that since she woke up at 11 AM, she has not felt much fetal movement.  She reports feeling good fetal movement prior to today.  She has been feeling irregular contractions.  She denies leaking or bleeding.    ROS-     - No vaginal bleeding    GI- No abdominal pain    /63   Wt 63.1 kg (139 lb 3.2 oz)   LMP 2023   BMI 25.46 kg/m²   Exam - See flow sheet    Fetal heart rate is normal    Assessment-  Diagnoses and all orders for this visit:    Decreased fetal movements in third trimester, single or unspecified fetus  -     US Ob Follow Up Transabdominal Approach; Future  -     US Fetal Biophysical Profile;Without Non-Stress Testing; Future    History of herpes genitalis  -     acyclovir (ZOVIRAX) 400 MG tablet; Take 1 tablet by mouth 3 (Three) Times a Day.     screening for streptococcus B  -     Group B Streptococcus Culture - Swab, Vaginal/Rectum    36 weeks gestation of pregnancy    GBS culture was obtained and prescription was sent for acyclovir for her to start for HSV suppression.  A growth ultrasound was performed that showed appropriate fetal growth, but BPP was 4 out of 8 with 2 points off for breathing and 2 points off for tone.  Due to abnormal  testing, patient was advised to go to labor and delivery for monitoring.

## 2023-12-13 ENCOUNTER — APPOINTMENT (OUTPATIENT)
Dept: ULTRASOUND IMAGING | Facility: HOSPITAL | Age: 18
End: 2023-12-13
Payer: COMMERCIAL

## 2023-12-13 VITALS
BODY MASS INDEX: 25.46 KG/M2 | OXYGEN SATURATION: 98 % | SYSTOLIC BLOOD PRESSURE: 93 MMHG | TEMPERATURE: 98.3 F | HEIGHT: 62 IN | RESPIRATION RATE: 16 BRPM | HEART RATE: 77 BPM | DIASTOLIC BLOOD PRESSURE: 53 MMHG

## 2023-12-13 PROBLEM — O36.8131 DECREASED FETAL MOVEMENT, THIRD TRIMESTER, FETUS 1: Status: ACTIVE | Noted: 2023-12-13

## 2023-12-13 PROCEDURE — 99238 HOSP IP/OBS DSCHRG MGMT 30/<: CPT | Performed by: OBSTETRICS & GYNECOLOGY

## 2023-12-13 PROCEDURE — G0378 HOSPITAL OBSERVATION PER HR: HCPCS

## 2023-12-13 PROCEDURE — 76819 FETAL BIOPHYS PROFIL W/O NST: CPT

## 2023-12-13 PROCEDURE — 76819 FETAL BIOPHYS PROFIL W/O NST: CPT | Performed by: OBSTETRICS & GYNECOLOGY

## 2023-12-13 RX ORDER — ACETAMINOPHEN 325 MG/1
650 TABLET ORAL EVERY 6 HOURS PRN
Status: DISCONTINUED | OUTPATIENT
Start: 2023-12-13 | End: 2023-12-13 | Stop reason: HOSPADM

## 2023-12-13 RX ADMIN — ACETAMINOPHEN 650 MG: 325 TABLET, FILM COATED ORAL at 08:46

## 2023-12-13 NOTE — PROGRESS NOTES
" [facility]     Progress Note    Patient Name: Mirta Pierce  : 2005  MRN: [id number]  Primary Care Physician:  Easton Colon MD  Date of admission: 2023    Subjective   Subjective     Chief Complaint: decreased fetal movement    History of Present Illness - Patient is a 18 year old G1 at 36+ weeks with uncomplicated prenatal care who was seen in office yesterday and reported decreased female movement.  The perception was isolated to yesterday.  She underwent biophysical profile and had score of 4/8; with NST, total scoring was 6/10.  Patient was admitted for monitoring and repeat testing in morning.    Patient reports excellent fetal movement today.  No pain or bleeding.    Review of Systems   Genitourinary:  Negative for pelvic pain and vaginal bleeding.       Objective   Objective     Vitals:   Temp:  [98.3 °F (36.8 °C)-98.7 °F (37.1 °C)] 98.3 °F (36.8 °C)  Heart Rate:  [] 77  Resp:  [16] 16  BP: ()/(53-73) 93/53    Physical Exam  Vitals and nursing note reviewed.   Constitutional:       Appearance: Normal appearance.   Abdominal:      Tenderness: There is no abdominal tenderness. There is no guarding or rebound.   Neurological:      Mental Status: She is alert.   Psychiatric:         Mood and Affect: Mood normal.         Behavior: Behavior normal.         Thought Content: Thought content normal.         Judgment: Judgment normal.          Result Review      BPP 8/8 with normal MARK and Category 1 NST (10/10)    Assessment & Plan   Assessment / Plan     Brief Patient Summary:  Mirta Pierce is a 18 y.o. female G1 at 36+ weeks with isolated decreased fetal movement    Active Hospital Problems:  Active Hospital Problems    Diagnosis     **Pregnancy      Plan:   - Discharge home.  Has follow up on Monday.  Patient instructed to do twice daily \"kick counts\" and return to the hospital if inadequate.      DVT prophylaxis:  No DVT prophylaxis order currently exists.    CODE STATUS:     "   Disposition:  I expect patient to be discharged today.    Bossman Coelho MD

## 2023-12-13 NOTE — PLAN OF CARE
Problem: Adult Inpatient Plan of Care  Goal: Plan of Care Review  12/13/2023 1100 by Teri Juarez RN  Outcome: Adequate for Care Transition  12/13/2023 0733 by Teri Juarez RN  Outcome: Ongoing, Progressing  Goal: Patient-Specific Goal (Individualized)  12/13/2023 1100 by Teri Juarez RN  Outcome: Adequate for Care Transition  12/13/2023 0733 by Teri Juarez RN  Outcome: Ongoing, Progressing  Goal: Absence of Hospital-Acquired Illness or Injury  12/13/2023 1100 by Teri Juarez RN  Outcome: Adequate for Care Transition  12/13/2023 0733 by Teri Juarez RN  Outcome: Ongoing, Progressing  Intervention: Identify and Manage Fall Risk  Recent Flowsheet Documentation  Taken 12/13/2023 0810 by Teri Juarez RN  Safety Promotion/Fall Prevention:   fall prevention program maintained   nonskid shoes/slippers when out of bed  Intervention: Prevent Skin Injury  Recent Flowsheet Documentation  Taken 12/13/2023 0810 by Teri Juarez RN  Body Position: position changed independently  Intervention: Prevent and Manage VTE (Venous Thromboembolism) Risk  Recent Flowsheet Documentation  Taken 12/13/2023 0810 by Teri Juarez RN  Range of Motion: active ROM (range of motion) encouraged  Goal: Optimal Comfort and Wellbeing  12/13/2023 1100 by Teri Juarez RN  Outcome: Adequate for Care Transition  12/13/2023 0733 by Teri Juarez RN  Outcome: Ongoing, Progressing  Intervention: Monitor Pain and Promote Comfort  Recent Flowsheet Documentation  Taken 12/13/2023 0810 by Teri Juarez RN  Pain Management Interventions:   quiet environment facilitated   position adjusted   see MAR  Intervention: Provide Person-Centered Care  Recent Flowsheet Documentation  Taken 12/13/2023 0810 by Teri Juarez RN  Trust Relationship/Rapport:   care explained   choices provided   emotional support provided   empathic listening provided   questions answered   questions encouraged   reassurance  provided   thoughts/feelings acknowledged  Goal: Readiness for Transition of Care  12/13/2023 1100 by Teri Juarez, RN  Outcome: Adequate for Care Transition  12/13/2023 0733 by Teri Juarez RN  Outcome: Ongoing, Progressing  Intervention: Mutually Develop Transition Plan  Recent Flowsheet Documentation  Taken 12/13/2023 1059 by Teri Juarez, RN  Transportation Anticipated: car, drives self  Patient/Family Anticipated Services at Transition: none  Patient/Family Anticipates Transition to: home     Problem: Maternal-Fetal Wellbeing  Goal: Optimal Maternal-Fetal Wellbeing  12/13/2023 1100 by Teri Juarez, RN  Outcome: Adequate for Care Transition  12/13/2023 0733 by Teri Juarez, RN  Outcome: Ongoing, Progressing  Intervention: Support Psychosocial Response to Complications During Pregnancy  Recent Flowsheet Documentation  Taken 12/13/2023 0810 by Teri Juarez, RN  Family/Support System Care:   self-care encouraged   support provided   Goal Outcome Evaluation:      POC resolved upon discharge home.

## 2023-12-13 NOTE — H&P
H&P Note    Patient Identification:  Name: Mirta Pierce  Age: 18 y.o.  Sex: female  :  2005  MRN: [id number]                       Chief Complaint: Decreased fetal movement    History of Present Illness:   18-year-old  1 para 0 presented for her visit with Dr. Johnson at 36-5/7 weeks.  In the office, she reported no sensation of fetal movement today.  She had not eaten very much during the day.  Biophysical profile was performed.  Amniotic fluid index was normal, but BPP was 4 out of 8.  The patient presented to labor and delivery for extended monitoring.      Initially, she had not felt fetal movement.  She ate dinner which consisted of a cheeseburger and French fries.  She then began to experience active fetal movement.    Problem List:  [unfilled]  Past Medical History:  Past Medical History:   Diagnosis Date    Herpes      Past Surgical History:  Past Surgical History:   Procedure Laterality Date    EAR TUBES      FOOT NAVICULAR EXCISION OR BONE GRAFT      WISDOM TOOTH EXTRACTION        Home Meds:  Medications Prior to Admission   Medication Sig Dispense Refill Last Dose    acetaminophen (TYLENOL) 500 MG tablet Take 2 tablets by mouth.       acyclovir (ZOVIRAX) 400 MG tablet Take 1 tablet by mouth 3 (Three) Times a Day. 60 tablet 1     ferrous sulfate 325 (65 FE) MG tablet Take 1 tablet by mouth Daily With Breakfast. 90 tablet 1     Prenatal Vit-Fe Fumarate-FA (prenatal vitamin 27-0.8) 27-0.8 MG tablet tablet Take  by mouth Daily.        Current Meds:   [unfilled]  Allergies:  No Known Allergies  Immunizations:    There is no immunization history on file for this patient.  Social History:   Social History     Tobacco Use    Smoking status: Never    Smokeless tobacco: Never   Substance Use Topics    Alcohol use: Never      Family History:  Family History   Problem Relation Age of Onset    Breast cancer Paternal Grandmother         Review of Systems  A comprehensive review of systems was  negative.    Objective:  tMax 24 hrs: Temp (24hrs), Av.5 °F (36.9 °C), Min:98.5 °F (36.9 °C), Max:98.5 °F (36.9 °C)    Vitals Ranges:   Temp:  [98.5 °F (36.9 °C)] 98.5 °F (36.9 °C)  Heart Rate:  [80] 80  Resp:  [16] 16  BP: (102-122)/(63-73) 122/73  Intake and Output Last 3 Shifts:   No intake/output data recorded.    Exam:     General Appearance:    Alert, cooperative, no distress, appears stated age   Head:    Normocephalic, without obvious abnormality, atraumatic   Back:     Symmetric, no curvature, ROM normal, no CVA tenderness   Lungs:     Clear to auscultation bilaterally, respirations unlabored   Chest Wall:    No tenderness or deformity    Heart:    Regular rate and rhythm, S1 and S2 normal, no murmur, rub   or gallop       Abdomen:   Soft, gravid.  No epigastric tenderness.  No fundal tenderness.  No right upper quadrant tenderness.           Extremities: Equal in size   Skin:   Skin color, texture, turgor normal, no rashes or lesions   Lymph nodes:   Cervical, supraclavicular, and axillary nodes normal       Data Review:    Lab Results (last 24 hours)       Procedure Component Value Units Date/Time    Comprehensive Metabolic Panel [[id number]]  (Abnormal) Collected: 23    Specimen: Blood Updated: 23     Glucose 106 mg/dL      BUN 4 mg/dL      Creatinine 0.51 mg/dL      Sodium 137 mmol/L      Potassium 3.2 mmol/L      Chloride 103 mmol/L      CO2 19.3 mmol/L      Calcium 8.6 mg/dL      Total Protein 6.6 g/dL      Albumin 3.7 g/dL      ALT (SGPT) 11 U/L      AST (SGOT) 16 U/L      Alkaline Phosphatase 169 U/L      Total Bilirubin 0.5 mg/dL      Globulin 2.9 gm/dL      A/G Ratio 1.3 g/dL      BUN/Creatinine Ratio 7.8     Anion Gap 14.7 mmol/L      eGFR 137.2 mL/min/1.73     Narrative:      GFR Normal >60  Chronic Kidney Disease <60  Kidney Failure <15      CBC & Differential [[id number]]  (Abnormal) Collected: 23    Specimen: Blood Updated: 23 182    Narrative:       The following orders were created for panel order CBC & Differential.  Procedure                               Abnormality         Status                     ---------                               -----------         ------                     CBC Auto Differential[[id number]]        Abnormal            Final result                 Please view results for these tests on the individual orders.    CBC Auto Differential [[id number]]  (Abnormal) Collected: 12/12/23 1816    Specimen: Blood Updated: 12/12/23 1826     WBC 12.75 10*3/mm3      RBC 4.98 10*6/mm3      Hemoglobin 12.2 g/dL      Hematocrit 39.2 %      MCV 78.7 fL      MCH 24.5 pg      MCHC 31.1 g/dL      RDW 12.9 %      RDW-SD 36.0 fl      MPV 10.9 fL      Platelets 233 10*3/mm3      Neutrophil % 85.4 %      Lymphocyte % 10.0 %      Monocyte % 3.7 %      Eosinophil % 0.1 %      Basophil % 0.2 %      Immature Grans % 0.6 %      Neutrophils, Absolute 10.89 10*3/mm3      Lymphocytes, Absolute 1.27 10*3/mm3      Monocytes, Absolute 0.47 10*3/mm3      Eosinophils, Absolute 0.01 10*3/mm3      Basophils, Absolute 0.03 10*3/mm3      Immature Grans, Absolute 0.08 10*3/mm3      nRBC 0.0 /100 WBC           Assessment:    Pregnancy      1.  Intrauterine pregnancy at 36-5/7 weeks  2.  Decreased fetal movement, now resolved.  Initially, the patient was not feeling fetal movement through the day.  She ate dinner and now the baby is moving actively.  At initial presentation, there was decreased long-term variability with only small accelerations and intermittent late decelerations.  After eating, there is now moderate long-term variability with accelerations and no decelerations.  The monitor strip is now category 1.  I counseled the patient and answered her questions.  We will observe her with continuous fetal monitoring overnight.  If the fetal heart rate strip remains reassuring, we will repeat the biophysical profile in the morning.  If the BPP is also reassuring, discharge  could be considered.  I answered the patient's questions and she agrees with these recommendations.        Costa Aragon MD  12/12/2023

## 2023-12-13 NOTE — PLAN OF CARE
Goal Outcome Evaluation:           Progress: improving  Outcome Evaluation: FHT moderate variability, accelerations present, occasional variable and late decelerations noted. mild contractions felt by patient and palpated.  Pt rates pain 5/10 in low abdomen and low back.    Repeat BPP ordered for this morning.

## 2023-12-14 ENCOUNTER — ANESTHESIA (OUTPATIENT)
Dept: LABOR AND DELIVERY | Facility: HOSPITAL | Age: 18
End: 2023-12-14
Payer: COMMERCIAL

## 2023-12-14 ENCOUNTER — HOSPITAL ENCOUNTER (INPATIENT)
Facility: HOSPITAL | Age: 18
LOS: 2 days | Discharge: HOME | End: 2023-12-16
Attending: OBSTETRICS & GYNECOLOGY | Admitting: OBSTETRICS & GYNECOLOGY
Payer: COMMERCIAL

## 2023-12-14 ENCOUNTER — ANESTHESIA EVENT (OUTPATIENT)
Dept: LABOR AND DELIVERY | Facility: HOSPITAL | Age: 18
End: 2023-12-14
Payer: COMMERCIAL

## 2023-12-14 DIAGNOSIS — R52 POSTPARTUM PAIN: Primary | ICD-10-CM

## 2023-12-14 PROBLEM — Z34.90 PREGNANCY: Status: RESOLVED | Noted: 2023-12-12 | Resolved: 2023-12-14

## 2023-12-14 LAB
A1 MICROGLOB PLACENTAL VAG QL: NEGATIVE
ABO GROUP BLD: NORMAL
ALBUMIN SERPL-MCNC: 3.5 G/DL (ref 3.5–5.2)
ALBUMIN/GLOB SERPL: 1.2 G/DL
ALP SERPL-CCNC: 171 U/L (ref 39–117)
ALT SERPL W P-5'-P-CCNC: 7 U/L (ref 1–33)
ANION GAP SERPL CALCULATED.3IONS-SCNC: 13.2 MMOL/L (ref 5–15)
AST SERPL-CCNC: 15 U/L (ref 1–32)
BACTERIA UR QL AUTO: ABNORMAL /HPF
BASOPHILS # BLD AUTO: 0.05 10*3/MM3 (ref 0–0.2)
BASOPHILS NFR BLD AUTO: 0.2 % (ref 0–1.5)
BILIRUB SERPL-MCNC: 0.3 MG/DL (ref 0–1.2)
BILIRUB UR QL STRIP: NEGATIVE
BLD GP AB SCN SERPL QL: NEGATIVE
BUN SERPL-MCNC: 4 MG/DL (ref 6–20)
BUN/CREAT SERPL: 8.5 (ref 7–25)
CALCIUM SPEC-SCNC: 8.3 MG/DL (ref 8.6–10.5)
CHLORIDE SERPL-SCNC: 104 MMOL/L (ref 98–107)
CLARITY UR: ABNORMAL
CO2 SERPL-SCNC: 19.8 MMOL/L (ref 22–29)
COLOR UR: YELLOW
CREAT SERPL-MCNC: 0.47 MG/DL (ref 0.57–1)
DEPRECATED RDW RBC AUTO: 37 FL (ref 37–54)
EGFRCR SERPLBLD CKD-EPI 2021: 140 ML/MIN/1.73
EOSINOPHIL # BLD AUTO: 0.01 10*3/MM3 (ref 0–0.4)
EOSINOPHIL NFR BLD AUTO: 0 % (ref 0.3–6.2)
ERYTHROCYTE [DISTWIDTH] IN BLOOD BY AUTOMATED COUNT: 13 % (ref 12.3–15.4)
GLOBULIN UR ELPH-MCNC: 3 GM/DL
GLUCOSE SERPL-MCNC: 89 MG/DL (ref 65–99)
GLUCOSE UR STRIP-MCNC: NEGATIVE MG/DL
HCT VFR BLD AUTO: 37.9 % (ref 34–46.6)
HGB BLD-MCNC: 11.7 G/DL (ref 12–15.9)
HGB UR QL STRIP.AUTO: NEGATIVE
HYALINE CASTS UR QL AUTO: ABNORMAL /LPF
IMM GRANULOCYTES # BLD AUTO: 0.15 10*3/MM3 (ref 0–0.05)
IMM GRANULOCYTES NFR BLD AUTO: 0.7 % (ref 0–0.5)
KETONES UR QL STRIP: NEGATIVE
LEUKOCYTE ESTERASE UR QL STRIP.AUTO: ABNORMAL
LYMPHOCYTES # BLD AUTO: 1.38 10*3/MM3 (ref 0.7–3.1)
LYMPHOCYTES NFR BLD AUTO: 6.8 % (ref 19.6–45.3)
MCH RBC QN AUTO: 24.4 PG (ref 26.6–33)
MCHC RBC AUTO-ENTMCNC: 30.9 G/DL (ref 31.5–35.7)
MCV RBC AUTO: 79.1 FL (ref 79–97)
MONOCYTES # BLD AUTO: 0.8 10*3/MM3 (ref 0.1–0.9)
MONOCYTES NFR BLD AUTO: 3.9 % (ref 5–12)
NEUTROPHILS NFR BLD AUTO: 18.01 10*3/MM3 (ref 1.7–7)
NEUTROPHILS NFR BLD AUTO: 88.4 % (ref 42.7–76)
NITRITE UR QL STRIP: NEGATIVE
NRBC BLD AUTO-RTO: 0 /100 WBC (ref 0–0.2)
PH UR STRIP.AUTO: 7 [PH] (ref 5–8)
PLATELET # BLD AUTO: 229 10*3/MM3 (ref 140–450)
PMV BLD AUTO: 11.1 FL (ref 6–12)
POTASSIUM SERPL-SCNC: 3.5 MMOL/L (ref 3.5–5.2)
PROT SERPL-MCNC: 6.5 G/DL (ref 6–8.5)
PROT UR QL STRIP: NEGATIVE
RBC # BLD AUTO: 4.79 10*6/MM3 (ref 3.77–5.28)
RBC # UR STRIP: ABNORMAL /HPF
REF LAB TEST METHOD: ABNORMAL
RH BLD: POSITIVE
SODIUM SERPL-SCNC: 137 MMOL/L (ref 136–145)
SP GR UR STRIP: 1.01 (ref 1–1.03)
SQUAMOUS #/AREA URNS HPF: ABNORMAL /HPF
T&S EXPIRATION DATE: NORMAL
UROBILINOGEN UR QL STRIP: ABNORMAL
WBC # UR STRIP: ABNORMAL /HPF
WBC NRBC COR # BLD AUTO: 20.4 10*3/MM3 (ref 3.4–10.8)

## 2023-12-14 PROCEDURE — 86901 BLOOD TYPING SEROLOGIC RH(D): CPT | Performed by: OBSTETRICS & GYNECOLOGY

## 2023-12-14 PROCEDURE — 99202 OFFICE O/P NEW SF 15 MIN: CPT | Performed by: OBSTETRICS & GYNECOLOGY

## 2023-12-14 PROCEDURE — 63710000001 PROMETHAZINE PER 25 MG: Performed by: OBSTETRICS & GYNECOLOGY

## 2023-12-14 PROCEDURE — 59410 OBSTETRICAL CARE: CPT | Performed by: OBSTETRICS & GYNECOLOGY

## 2023-12-14 PROCEDURE — 25010000002 ONDANSETRON PER 1 MG: Performed by: OBSTETRICS & GYNECOLOGY

## 2023-12-14 PROCEDURE — 81001 URINALYSIS AUTO W/SCOPE: CPT | Performed by: OBSTETRICS & GYNECOLOGY

## 2023-12-14 PROCEDURE — 0HQ9XZZ REPAIR PERINEUM SKIN, EXTERNAL APPROACH: ICD-10-PCS | Performed by: OBSTETRICS & GYNECOLOGY

## 2023-12-14 PROCEDURE — 25010000002 PENICILLIN G POTASSIUM PER 600000 UNITS: Performed by: OBSTETRICS & GYNECOLOGY

## 2023-12-14 PROCEDURE — 25810000003 LACTATED RINGERS PER 1000 ML: Performed by: OBSTETRICS & GYNECOLOGY

## 2023-12-14 PROCEDURE — 25010000002 KETOROLAC TROMETHAMINE PER 15 MG: Performed by: OBSTETRICS & GYNECOLOGY

## 2023-12-14 PROCEDURE — 86850 RBC ANTIBODY SCREEN: CPT | Performed by: OBSTETRICS & GYNECOLOGY

## 2023-12-14 PROCEDURE — 25810000003 LACTATED RINGERS SOLUTION: Performed by: OBSTETRICS & GYNECOLOGY

## 2023-12-14 PROCEDURE — 25010000002 ROPIVACAINE PER 1 MG: Performed by: ANESTHESIOLOGY

## 2023-12-14 PROCEDURE — 85025 COMPLETE CBC W/AUTO DIFF WBC: CPT | Performed by: OBSTETRICS & GYNECOLOGY

## 2023-12-14 PROCEDURE — 87086 URINE CULTURE/COLONY COUNT: CPT | Performed by: OBSTETRICS & GYNECOLOGY

## 2023-12-14 PROCEDURE — 86900 BLOOD TYPING SEROLOGIC ABO: CPT | Performed by: OBSTETRICS & GYNECOLOGY

## 2023-12-14 PROCEDURE — 84112 EVAL AMNIOTIC FLUID PROTEIN: CPT | Performed by: OBSTETRICS & GYNECOLOGY

## 2023-12-14 PROCEDURE — 25010000002 LIDOCAINE 1 % SOLUTION: Performed by: ANESTHESIOLOGY

## 2023-12-14 PROCEDURE — C1755 CATHETER, INTRASPINAL: HCPCS | Performed by: ANESTHESIOLOGY

## 2023-12-14 PROCEDURE — 80053 COMPREHEN METABOLIC PANEL: CPT | Performed by: OBSTETRICS & GYNECOLOGY

## 2023-12-14 RX ORDER — TRAMADOL HYDROCHLORIDE 50 MG/1
50 TABLET ORAL EVERY 6 HOURS PRN
Status: DISCONTINUED | OUTPATIENT
Start: 2023-12-14 | End: 2023-12-16 | Stop reason: HOSPADM

## 2023-12-14 RX ORDER — TERBUTALINE SULFATE 1 MG/ML
0.25 INJECTION, SOLUTION SUBCUTANEOUS AS NEEDED
Status: DISCONTINUED | OUTPATIENT
Start: 2023-12-14 | End: 2023-12-14 | Stop reason: HOSPADM

## 2023-12-14 RX ORDER — SODIUM CHLORIDE 9 MG/ML
40 INJECTION, SOLUTION INTRAVENOUS AS NEEDED
Status: DISCONTINUED | OUTPATIENT
Start: 2023-12-14 | End: 2023-12-14 | Stop reason: HOSPADM

## 2023-12-14 RX ORDER — OXYTOCIN/0.9 % SODIUM CHLORIDE 30/500 ML
125 PLASTIC BAG, INJECTION (ML) INTRAVENOUS CONTINUOUS PRN
Status: COMPLETED | OUTPATIENT
Start: 2023-12-14 | End: 2023-12-14

## 2023-12-14 RX ORDER — KETOROLAC TROMETHAMINE 15 MG/ML
15 INJECTION, SOLUTION INTRAMUSCULAR; INTRAVENOUS ONCE
Status: COMPLETED | OUTPATIENT
Start: 2023-12-14 | End: 2023-12-14

## 2023-12-14 RX ORDER — LIDOCAINE HYDROCHLORIDE 10 MG/ML
INJECTION, SOLUTION INFILTRATION; PERINEURAL AS NEEDED
Status: DISCONTINUED | OUTPATIENT
Start: 2023-12-14 | End: 2023-12-14 | Stop reason: SURG

## 2023-12-14 RX ORDER — OXYTOCIN/0.9 % SODIUM CHLORIDE 30/500 ML
250 PLASTIC BAG, INJECTION (ML) INTRAVENOUS CONTINUOUS
Status: DISPENSED | OUTPATIENT
Start: 2023-12-14 | End: 2023-12-14

## 2023-12-14 RX ORDER — MISOPROSTOL 200 UG/1
800 TABLET ORAL ONCE AS NEEDED
Status: DISCONTINUED | OUTPATIENT
Start: 2023-12-14 | End: 2023-12-14 | Stop reason: HOSPADM

## 2023-12-14 RX ORDER — FAMOTIDINE 10 MG/ML
20 INJECTION, SOLUTION INTRAVENOUS 2 TIMES DAILY PRN
Status: DISCONTINUED | OUTPATIENT
Start: 2023-12-14 | End: 2023-12-14 | Stop reason: HOSPADM

## 2023-12-14 RX ORDER — ONDANSETRON 4 MG/1
4 TABLET, FILM COATED ORAL EVERY 8 HOURS PRN
Status: DISCONTINUED | OUTPATIENT
Start: 2023-12-14 | End: 2023-12-16 | Stop reason: HOSPADM

## 2023-12-14 RX ORDER — HYDROCORTISONE 25 MG/G
1 CREAM TOPICAL AS NEEDED
Status: DISCONTINUED | OUTPATIENT
Start: 2023-12-14 | End: 2023-12-16 | Stop reason: HOSPADM

## 2023-12-14 RX ORDER — OXYTOCIN/0.9 % SODIUM CHLORIDE 30/500 ML
999 PLASTIC BAG, INJECTION (ML) INTRAVENOUS ONCE
Status: COMPLETED | OUTPATIENT
Start: 2023-12-14 | End: 2023-12-14

## 2023-12-14 RX ORDER — CARBOPROST TROMETHAMINE 250 UG/ML
250 INJECTION, SOLUTION INTRAMUSCULAR
Status: DISCONTINUED | OUTPATIENT
Start: 2023-12-14 | End: 2023-12-14 | Stop reason: HOSPADM

## 2023-12-14 RX ORDER — ROPIVACAINE HYDROCHLORIDE 2 MG/ML
10 INJECTION, SOLUTION EPIDURAL; INFILTRATION; PERINEURAL CONTINUOUS
Status: DISCONTINUED | OUTPATIENT
Start: 2023-12-14 | End: 2023-12-14

## 2023-12-14 RX ORDER — ERYTHROMYCIN 5 MG/G
OINTMENT OPHTHALMIC
Status: ACTIVE
Start: 2023-12-14 | End: 2023-12-15

## 2023-12-14 RX ORDER — KETOROLAC TROMETHAMINE 15 MG/ML
15 INJECTION, SOLUTION INTRAMUSCULAR; INTRAVENOUS EVERY 6 HOURS PRN
Status: DISCONTINUED | OUTPATIENT
Start: 2023-12-14 | End: 2023-12-14

## 2023-12-14 RX ORDER — DOCUSATE SODIUM 100 MG/1
100 CAPSULE, LIQUID FILLED ORAL 2 TIMES DAILY
Status: DISCONTINUED | OUTPATIENT
Start: 2023-12-14 | End: 2023-12-16 | Stop reason: HOSPADM

## 2023-12-14 RX ORDER — EPHEDRINE SULFATE 50 MG/ML
10 INJECTION, SOLUTION INTRAVENOUS
Status: DISCONTINUED | OUTPATIENT
Start: 2023-12-14 | End: 2023-12-14 | Stop reason: HOSPADM

## 2023-12-14 RX ORDER — PRENATAL VIT/IRON FUM/FOLIC AC 27MG-0.8MG
1 TABLET ORAL DAILY
Status: DISCONTINUED | OUTPATIENT
Start: 2023-12-14 | End: 2023-12-16 | Stop reason: HOSPADM

## 2023-12-14 RX ORDER — MAGNESIUM CARB/ALUMINUM HYDROX 105-160MG
30 TABLET,CHEWABLE ORAL ONCE AS NEEDED
Status: DISCONTINUED | OUTPATIENT
Start: 2023-12-14 | End: 2023-12-14 | Stop reason: HOSPADM

## 2023-12-14 RX ORDER — IBUPROFEN 600 MG/1
600 TABLET ORAL EVERY 6 HOURS PRN
Status: DISCONTINUED | OUTPATIENT
Start: 2023-12-14 | End: 2023-12-16 | Stop reason: HOSPADM

## 2023-12-14 RX ORDER — ACETAMINOPHEN 325 MG/1
650 TABLET ORAL EVERY 6 HOURS PRN
Status: DISCONTINUED | OUTPATIENT
Start: 2023-12-14 | End: 2023-12-16 | Stop reason: HOSPADM

## 2023-12-14 RX ORDER — LIDOCAINE HYDROCHLORIDE 10 MG/ML
0.5 INJECTION, SOLUTION INFILTRATION; PERINEURAL ONCE AS NEEDED
Status: DISCONTINUED | OUTPATIENT
Start: 2023-12-14 | End: 2023-12-14 | Stop reason: HOSPADM

## 2023-12-14 RX ORDER — PROMETHAZINE HYDROCHLORIDE 25 MG/1
25 TABLET ORAL EVERY 6 HOURS PRN
Status: DISCONTINUED | OUTPATIENT
Start: 2023-12-14 | End: 2023-12-16 | Stop reason: HOSPADM

## 2023-12-14 RX ORDER — TRANEXAMIC ACID 10 MG/ML
1000 INJECTION, SOLUTION INTRAVENOUS ONCE AS NEEDED
Status: DISCONTINUED | OUTPATIENT
Start: 2023-12-14 | End: 2023-12-14

## 2023-12-14 RX ORDER — CALCIUM CARBONATE 500 MG/1
2 TABLET, CHEWABLE ORAL 3 TIMES DAILY PRN
Status: DISCONTINUED | OUTPATIENT
Start: 2023-12-14 | End: 2023-12-16 | Stop reason: HOSPADM

## 2023-12-14 RX ORDER — SODIUM CHLORIDE 0.9 % (FLUSH) 0.9 %
10 SYRINGE (ML) INJECTION AS NEEDED
Status: DISCONTINUED | OUTPATIENT
Start: 2023-12-14 | End: 2023-12-14 | Stop reason: HOSPADM

## 2023-12-14 RX ORDER — METHYLERGONOVINE MALEATE 0.2 MG/ML
200 INJECTION INTRAVENOUS ONCE AS NEEDED
Status: DISCONTINUED | OUTPATIENT
Start: 2023-12-14 | End: 2023-12-14 | Stop reason: HOSPADM

## 2023-12-14 RX ORDER — PENICILLIN G 3000000 [IU]/50ML
3 INJECTION, SOLUTION INTRAVENOUS EVERY 4 HOURS
Status: DISCONTINUED | OUTPATIENT
Start: 2023-12-14 | End: 2023-12-14 | Stop reason: HOSPADM

## 2023-12-14 RX ORDER — FAMOTIDINE 20 MG/1
20 TABLET, FILM COATED ORAL 2 TIMES DAILY PRN
Status: DISCONTINUED | OUTPATIENT
Start: 2023-12-14 | End: 2023-12-14 | Stop reason: HOSPADM

## 2023-12-14 RX ORDER — OXYCODONE HYDROCHLORIDE 5 MG/1
5 TABLET ORAL ONCE
Status: COMPLETED | OUTPATIENT
Start: 2023-12-14 | End: 2023-12-14

## 2023-12-14 RX ORDER — NALOXONE HCL 0.4 MG/ML
0.4 VIAL (ML) INJECTION
Status: DISCONTINUED | OUTPATIENT
Start: 2023-12-14 | End: 2023-12-14 | Stop reason: HOSPADM

## 2023-12-14 RX ORDER — SODIUM CHLORIDE 0.9 % (FLUSH) 0.9 %
10 SYRINGE (ML) INJECTION EVERY 12 HOURS SCHEDULED
Status: DISCONTINUED | OUTPATIENT
Start: 2023-12-14 | End: 2023-12-14 | Stop reason: HOSPADM

## 2023-12-14 RX ORDER — BISACODYL 10 MG
10 SUPPOSITORY, RECTAL RECTAL DAILY PRN
Status: DISCONTINUED | OUTPATIENT
Start: 2023-12-15 | End: 2023-12-16 | Stop reason: HOSPADM

## 2023-12-14 RX ORDER — ONDANSETRON 2 MG/ML
4 INJECTION INTRAMUSCULAR; INTRAVENOUS EVERY 6 HOURS PRN
Status: DISCONTINUED | OUTPATIENT
Start: 2023-12-14 | End: 2023-12-14 | Stop reason: HOSPADM

## 2023-12-14 RX ORDER — SODIUM CHLORIDE, SODIUM LACTATE, POTASSIUM CHLORIDE, CALCIUM CHLORIDE 600; 310; 30; 20 MG/100ML; MG/100ML; MG/100ML; MG/100ML
125 INJECTION, SOLUTION INTRAVENOUS CONTINUOUS
Status: DISCONTINUED | OUTPATIENT
Start: 2023-12-14 | End: 2023-12-14

## 2023-12-14 RX ORDER — HYDROXYZINE 50 MG/1
50 TABLET, FILM COATED ORAL NIGHTLY PRN
Status: DISCONTINUED | OUTPATIENT
Start: 2023-12-14 | End: 2023-12-16 | Stop reason: HOSPADM

## 2023-12-14 RX ORDER — PHYTONADIONE 1 MG/.5ML
INJECTION, EMULSION INTRAMUSCULAR; INTRAVENOUS; SUBCUTANEOUS
Status: ACTIVE
Start: 2023-12-14 | End: 2023-12-15

## 2023-12-14 RX ORDER — ACETAMINOPHEN 325 MG/1
650 TABLET ORAL EVERY 4 HOURS PRN
Status: DISCONTINUED | OUTPATIENT
Start: 2023-12-14 | End: 2023-12-14 | Stop reason: HOSPADM

## 2023-12-14 RX ORDER — ONDANSETRON 4 MG/1
4 TABLET, FILM COATED ORAL EVERY 6 HOURS PRN
Status: DISCONTINUED | OUTPATIENT
Start: 2023-12-14 | End: 2023-12-14 | Stop reason: HOSPADM

## 2023-12-14 RX ORDER — MORPHINE SULFATE 2 MG/ML
1 INJECTION, SOLUTION INTRAMUSCULAR; INTRAVENOUS EVERY 4 HOURS PRN
Status: DISCONTINUED | OUTPATIENT
Start: 2023-12-14 | End: 2023-12-14 | Stop reason: HOSPADM

## 2023-12-14 RX ORDER — SODIUM CHLORIDE 0.9 % (FLUSH) 0.9 %
1-10 SYRINGE (ML) INJECTION AS NEEDED
Status: DISCONTINUED | OUTPATIENT
Start: 2023-12-14 | End: 2023-12-16 | Stop reason: HOSPADM

## 2023-12-14 RX ADMIN — IBUPROFEN 600 MG: 600 TABLET ORAL at 22:33

## 2023-12-14 RX ADMIN — ROPIVACAINE HYDROCHLORIDE 10 ML: 2 INJECTION, SOLUTION EPIDURAL; INFILTRATION at 09:33

## 2023-12-14 RX ADMIN — PENICILLIN G 3 MILLION UNITS: 3000000 INJECTION, SOLUTION INTRAVENOUS at 13:57

## 2023-12-14 RX ADMIN — DOCUSATE SODIUM 100 MG: 100 CAPSULE, LIQUID FILLED ORAL at 21:01

## 2023-12-14 RX ADMIN — LIDOCAINE HYDROCHLORIDE 3 MG: 10 INJECTION, SOLUTION INFILTRATION; PERINEURAL at 09:28

## 2023-12-14 RX ADMIN — KETOROLAC TROMETHAMINE 15 MG: 15 INJECTION, SOLUTION INTRAMUSCULAR; INTRAVENOUS at 15:30

## 2023-12-14 RX ADMIN — EPHEDRINE SULFATE 10 MG: 50 INJECTION INTRAVENOUS at 13:22

## 2023-12-14 RX ADMIN — SODIUM CHLORIDE, POTASSIUM CHLORIDE, SODIUM LACTATE AND CALCIUM CHLORIDE 999 ML/HR: 600; 310; 30; 20 INJECTION, SOLUTION INTRAVENOUS at 13:22

## 2023-12-14 RX ADMIN — SODIUM CHLORIDE, POTASSIUM CHLORIDE, SODIUM LACTATE AND CALCIUM CHLORIDE 1000 ML: 600; 310; 30; 20 INJECTION, SOLUTION INTRAVENOUS at 09:02

## 2023-12-14 RX ADMIN — Medication 125 ML/HR: at 16:27

## 2023-12-14 RX ADMIN — SODIUM CHLORIDE 5 MILLION UNITS: 9 INJECTION, SOLUTION INTRAVENOUS at 09:10

## 2023-12-14 RX ADMIN — OXYCODONE HYDROCHLORIDE 5 MG: 5 TABLET ORAL at 16:56

## 2023-12-14 RX ADMIN — ACETAMINOPHEN 650 MG: 325 TABLET, FILM COATED ORAL at 18:28

## 2023-12-14 RX ADMIN — ROPIVACAINE HYDROCHLORIDE 10 ML/HR: 2 INJECTION, SOLUTION EPIDURAL; INFILTRATION at 09:34

## 2023-12-14 RX ADMIN — PROMETHAZINE HYDROCHLORIDE 25 MG: 25 TABLET ORAL at 22:32

## 2023-12-14 RX ADMIN — TRAMADOL HYDROCHLORIDE 50 MG: 50 TABLET, COATED ORAL at 16:06

## 2023-12-14 RX ADMIN — EPHEDRINE SULFATE 10 MG: 50 INJECTION INTRAVENOUS at 10:57

## 2023-12-14 RX ADMIN — SODIUM CHLORIDE, POTASSIUM CHLORIDE, SODIUM LACTATE AND CALCIUM CHLORIDE 1000 ML: 600; 310; 30; 20 INJECTION, SOLUTION INTRAVENOUS at 09:59

## 2023-12-14 RX ADMIN — ONDANSETRON 4 MG: 2 INJECTION INTRAMUSCULAR; INTRAVENOUS at 17:38

## 2023-12-14 RX ADMIN — ONDANSETRON 4 MG: 2 INJECTION INTRAMUSCULAR; INTRAVENOUS at 09:03

## 2023-12-14 RX ADMIN — KETOROLAC TROMETHAMINE 15 MG: 15 INJECTION, SOLUTION INTRAMUSCULAR; INTRAVENOUS at 16:56

## 2023-12-14 RX ADMIN — Medication 999 ML/HR: at 15:08

## 2023-12-14 NOTE — ANESTHESIA PROCEDURE NOTES
Labor Epidural      Patient reassessed immediately prior to procedure    Patient location during procedure: OB  Performed By  Anesthesiologist: Conrad Mejia MD  Preanesthetic Checklist  Completed: patient identified, IV checked, site marked, risks and benefits discussed, surgical consent, monitors and equipment checked, pre-op evaluation and timeout performed  Prep:  Pt Position:sitting  Sterile Tech:cap, gloves and sterile barrier  Monitoring:blood pressure monitoring, continuous pulse oximetry and EKG  Epidural Block Procedure:  Approach:midline  Guidance:palpation technique  Location:L4-L5  Needle Type:Tuohy  Needle Gauge:17 G  Loss of Resistance Medium: saline  Cath Depth at skin:8 cm  Paresthesia: none  Aspiration:negative  Test Dose:negative  Number of Attempts: 2  Post Assessment:  Dressing:occlusive dressing applied and secured with tape  Pt Tolerance:patient tolerated the procedure well with no apparent complications  Complications:no

## 2023-12-14 NOTE — OBED NOTES
[facility] Note OB    Patient Name: Mirta Pierce  YOB: 2005  MRN: [id number]  Admission Date: 2023  6:47 AM  Date of Service: 2023    Chief Complaint: Contractions (Worsened about 0500. Now every 5 minutes.  +FM, Denies leakage or bleeding)        Subjective     Mirta Pierce is a 18 y.o. female  at 37w0d with Estimated Date of Delivery: 24 who presents with the chief complaint listed above. While here in triage pt reports leaking fluid, Nit equivocal.Review of office records shows 2 days ago cervix was 2/50 %.Pt is purse lip breathing thru contractions.     She sees Halle Mandujano MD for her prenatal care. Her pregnancy has been complicated by:  RNI, anemia, hx of HSV    She describes fetal movement as normal.  She not sure about rupture of membranes.  She denies vaginal bleeding. She is feeling contractions.        Objective   Patient Active Problem List    Diagnosis     Decreased fetal movement, third trimester, fetus 1 [O36.8131]     Pregnancy [Z34.90]     History of herpes genitalis [Z86.19]     Maternal anemia in pregnancy, antepartum [O99.019]         OB History    Para Term  AB Living   1 0 0 0 0 0   SAB IAB Ectopic Molar Multiple Live Births   0 0 0 0 0 0      # Outcome Date GA Lbr Tan/2nd Weight Sex Delivery Anes PTL Lv   1 Current                 Past Medical History:   Diagnosis Date    Herpes        Past Surgical History:   Procedure Laterality Date    EAR TUBES      FOOT NAVICULAR EXCISION OR BONE GRAFT      WISDOM TOOTH EXTRACTION         Current Facility-Administered Medications on File Prior to Encounter   Medication Dose Route Frequency Provider Last Rate Last Admin    [DISCONTINUED] acetaminophen (TYLENOL) tablet 650 mg  650 mg Oral Q6H PRN Bossman Coelho MD   650 mg at 23 0846    [DISCONTINUED] sodium chloride 0.9 % flush 10 mL  10 mL Intravenous Q12H Costa Aragon MD   10 mL at 232    [DISCONTINUED] sodium chloride  "0.9 % flush 10 mL  10 mL Intravenous PRN Costa Aragon MD         Current Outpatient Medications on File Prior to Encounter   Medication Sig Dispense Refill    acetaminophen (TYLENOL) 500 MG tablet Take 2 tablets by mouth.      acyclovir (ZOVIRAX) 400 MG tablet Take 1 tablet by mouth 3 (Three) Times a Day. 60 tablet 1    ferrous sulfate 325 (65 FE) MG tablet Take 1 tablet by mouth Daily With Breakfast. 90 tablet 1    Prenatal Vit-Fe Fumarate-FA (prenatal vitamin 27-0.8) 27-0.8 MG tablet tablet Take  by mouth Daily.         No Known Allergies    Family History   Problem Relation Age of Onset    Breast cancer Paternal Grandmother        Social History     Socioeconomic History    Marital status: Single   Tobacco Use    Smoking status: Never    Smokeless tobacco: Never   Vaping Use    Vaping Use: Never used   Substance and Sexual Activity    Alcohol use: Never    Drug use: Never    Sexual activity: Yes           Review of Systems   Constitutional:  Negative for chills and fever.   HENT: Negative.     Eyes:  Negative for photophobia and visual disturbance.   Respiratory:  Negative for shortness of breath.    Cardiovascular:  Negative for chest pain.   Gastrointestinal:  Positive for abdominal pain. Negative for nausea.   Genitourinary:  Positive for vaginal discharge.   Psychiatric/Behavioral:  The patient is not nervous/anxious.           PHYSICAL EXAM:      VITAL SIGNS:  Vitals:    12/14/23 0700 12/14/23 0707   BP: 119/70    Pulse: 92    Resp:  18   Temp:  97.7 °F (36.5 °C)   TempSrc:  Oral   SpO2:  100%   Height:  157.5 cm (62\")            FHT'S:                       Baseline:         Fetal HR Baseline: normal range                   Beats/min:       Fetal HR (beats/min): 145        Variability:        Fetal HR Variability: moderate (amplitude range 6 to 25 bpm)  Accelerations:  Fetal HR Accelerations: greater than/equal to 15 bpm, lasting at least 15 seconds  Decelerations:  Fetal HR Decelerations: " "variable      Interpretation:  reassuring and category 1                                     PHYSICAL EXAM:      General: well developed; well nourished  no acute distress   Heart: Not performed.   Lungs   breathing is unlabored   Abdomen: Gravid and non tender     Extremities: trace edema, DTRs 1 plus, no clonus       Cervix: Per RN  Cervical Dilation (cm): 3  Cervical Effacement: 90%  Fetal Station: -2  Cervical Consistency: soft  Cervical Position: posterior     Contractions:   regular                    LABS AND TESTING ORDERED:  Uterine and fetal monitoring  Urinalysis   3. ROM plus, admit labs    LAB RESULTS:    Recent Results (from the past 24 hour(s))   Urinalysis With Culture If Indicated - Urine, Clean Catch    Collection Time: 12/14/23  7:11 AM    Specimen: Urine, Clean Catch   Result Value Ref Range    Color, UA Yellow Yellow, Straw    Appearance, UA Cloudy (A) Clear    pH, UA 7.0 5.0 - 8.0    Specific Gravity, UA 1.015 1.005 - 1.030    Glucose, UA Negative Negative    Ketones, UA Negative Negative    Bilirubin, UA Negative Negative    Blood, UA Negative Negative    Protein, UA Negative Negative    Leuk Esterase, UA Moderate (2+) (A) Negative    Nitrite, UA Negative Negative    Urobilinogen, UA 1.0 E.U./dL 0.2 - 1.0 E.U./dL   Urinalysis, Microscopic Only - Urine, Clean Catch    Collection Time: 12/14/23  7:11 AM    Specimen: Urine, Clean Catch   Result Value Ref Range    RBC, UA 0-2 None Seen, 0-2 /HPF    WBC, UA 11-20 (A) None Seen, 0-2 /HPF    Bacteria, UA 1+ (A) None Seen /HPF    Squamous Epithelial Cells, UA 13-20 (A) None Seen, 0-2 /HPF    Hyaline Casts, UA 0-2 None Seen /LPF    Methodology Automated Microscopy        Lab Results   Component Value Date    ABO O 12/12/2023    RH Positive 12/12/2023       No results found for: \"STREPGPB\"              External Prenatal Results       Pregnancy Outside Results - Transcribed From Office Records - See Scanned Records For Details       Test Value Date Time "    ABO  O  23 1816    Rh  Positive  23 1816    Antibody Screen  Negative  23 1816       Negative  23 1448      ^ Negative  23 1248    Varicella IgG ^ 1.1 AI 23 1248    Rubella  <0.90 index 23 1448    Hgb  12.2 g/dL 23 1816       10.8 g/dL 10/27/23 1516       13.5 g/dL 23 1448      ^ 14.3 g/dL 23 1248    Hct  39.2 % 23 1816       32.9 % 10/27/23 1516       40.2 % 23 1448      ^ 42.9 % 23 1248    Glucose Fasting GTT       Glucose Tolerance Test 1 hour       Glucose Tolerance Test 3 hour       Gonorrhea (discrete)  Negative  23 1509    Chlamydia (discrete)  Negative  23 1509    RPR  Non Reactive  23 1448    VDRL       Syphilis Antibody ^ <0.2 AI 23 1248    HBsAg  Negative  23 1448      ^ Nonreactive  23 1248    Herpes Simplex Virus PCR       Herpes Simplex VIrus Culture       HIV  Non Reactive  23 1448      ^ Nonreactive  23 1248    Hep C RNA Quant PCR       Hep C Antibody  Non Reactive  23 1448      ^ Nonreactive  23 1248    AFP  41.6 ng/mL 23 1311    Group B Strep       GBS Susceptibility to Clindamycin       GBS Susceptibility to Erythromycin       Fetal Fibronectin       Genetic Testing, Maternal Blood                 Drug Screening       Test Value Date Time    Urine Drug Screen       Amphetamine Screen  Negative ng/mL 23 1509    Barbiturate Screen  Negative ng/mL 23 1509    Benzodiazepine Screen  Negative ng/mL 23 1509    Methadone Screen  Negative ng/mL 23 1509    Phencyclidine Screen  Negative ng/mL 23 1509    Opiates Screen       THC Screen       Cocaine Screen       Propoxyphene Screen  Negative ng/mL 23 1509    Buprenorphine Screen       Methamphetamine Screen       Oxycodone Screen       Tricyclic Antidepressants Screen                 Legend    ^: Historical                              Impression:   @ 37w0d .  Final  Diagnosis: early active labor    Plan:  Dr Serna notified of pts admission, fetal and uterine monitoring  continuously, expectant management, and analgesia with  epidural        Teri Rios MD  12/14/2023  07:50 EST

## 2023-12-14 NOTE — L&D DELIVERY NOTE
[facility]   Vaginal Delivery Note    Patient Name: Mirta Pierce  : 2005  MRN: [id number]    Date of Delivery: 2023     Diagnosis     Pre & Post-Delivery:  Intrauterine pregnancy at 37w0d  Labor status: Spontaneous Onset of Labor     * No active hospital problems. *             Problem List    Transfer to Postpartum     Review the Delivery Report for details.     Delivery     Delivery: Vaginal, Spontaneous     YOB: 2025    Time of Birth:  Gestational Age 3:06 PM   37w0d     Anesthesia: Epidural     Delivering clinician: Vanessa Serna    Forceps?   No   Vacuum? No    Shoulder dystocia present: No        Delivery narrative:    Preop diagnosis:  18 y.o.  year old  at 37w0d      Spontaneous onset of labor   Postop diagnosis: Same    Indications: Mirta Pierce is a 18 y.o.  who presented at 37w0d with labor. She had SROM and progressed along a normal labor curve. She received penicillin for GBS prophylaxis as status was unknown and she received an epidural for pain control.    Findings: Female infant, Apgar 7/8, Weight 2830g; first degree lacerations noted and repaired; Placenta intact    QBL: Quantitative Blood Loss (mL): 167 mL    Procedure:The cervix was noted to be completely dilated, completely effaced, and +3 station. Under continuous electronic fetal heart rate monitoring, the patient was encouraged to push. With good maternal effort she delivered a viable female infant. The head presented in the OA presentation and restituted to maternal right. There was no nuchal cord present. The right anterior fetal shoulder was then easily delivered with a gentle downward motion followed by the posterior fetal shoulder, followed by the remainder of the infant without difficulty. The infant was immediately vigorous. The cord was clamped 30 seconds following delivery. The cord was cut and the infant was placed to maternal abdomen. Cord blood was then collected. The placenta  "then delivered spontaneously intact, and a three vessel cord was noted. Uterine massage and IV Pitocin were given until the fundus was firm. The cervix, vagina, perineum, and rectum were carefully inspected for lacerations and a first degree was noted. This was repaired in standard fashion with 3-0 vicryl. Counts for needles, sponges, laps and instruments were correct times two at the end of the delivery. There were no sponges left in the vagina. There were no major complications. Mother and baby were bonding well at the end of the delivery.            Infant     Findings: female  infant     Infant observations: Weight: 2830 g (6 lb 3.8 oz)   Length: 18.5  in  Observations/Comments:  warmer room 6      Apgars: 7  @ 1 minute /    8  @ 5 minutes         Placenta & Cord         Placenta delivered  Manual removal  at   12/14/2023  3:08 PM     Cord: 3 vessels  present.   Nuchal Cord?  no   Cord blood obtained: Yes    Cord gases obtained:  No    Cord gas results: Venous:  No results found for: \"PHCVEN\", \"BECVEN\"    Arterial:  No results found for: \"PHCART\", \"BECART\"     Repair     Episiotomy: None     No    Lacerations: Yes  Laceration Information  Laceration Repaired?   Perineal: 1st  Yes    Periurethral:       Labial:       Sulcus:       Vaginal:       Cervical:         Suture used for repair: 3-0 Vicryl  Laceration Length for 3rd or 4th degree lacerations: N/A   Estimated Blood Loss:       Quantitative Blood Loss: Quantitative Blood Loss (mL): 167 mL (12/14/23 1508)        Complications     none    Disposition     Mother to Mother Baby/Postpartum  in stable condition currently.  Baby to remains with mom  in stable condition currently.    Vanessa Serna MD  12/14/23  16:24 EST        "

## 2023-12-14 NOTE — H&P
[facility]  Obstetric History and Physical    Chief Complaint   Patient presents with    Contractions     Worsened about 0500. Now every 5 minutes.  +FM, Denies leakage or bleeding       Subjective     Patient is a 18 y.o. female  currently at 37w0d, who presents with labor.  She has a pregnancy complicated by anemia, rubella non immune, h/o HSV.  She has no prodromal symptoms and no current outbreak.  She had GBS done 2 days ago, still pending.  She is more comfortable now that she has an epidural. She is here today with her mom and two sisters.        Prenatal Information:  Prenatal Results       Initial Prenatal Labs       Test Value Reference Range Date Time    Hemoglobin  13.5 g/dL 11.1 - 15.9 23 1448      ^ 14.3 g/dL 12.0 - 16.0 23 1248    Hematocrit  40.2 % 34.0 - 46.6 23 1448      ^ 42.9 % 36.0 - 46.0 23 1248    Platelets  253 x10E3/uL 150 - 450 23 1448      ^ 280 10*3/uL 140 - 440 23 1248    Rubella IgG  <0.90 index Immune >0.99 23 1448    Hepatitis B SAg  Negative  Negative 23 1448      ^ Nonreactive  Nonreactive 23 1248    Hepatitis C Ab  Non Reactive  Non Reactive 23 1448      ^ Nonreactive  Nonreactive 23 1248    RPR  Non Reactive  Non Reactive 23 1448    T. Pallidum Ab         ABO  O   23 1816    Rh  Positive   23 1816    Antibody Screen  Negative  Negative 23 1448      ^ Negative   23 1248    HIV  Non Reactive  Non Reactive 23 1448      ^ Nonreactive  Nonreactive 23 1248    Urine Culture  No growth   23 0411       Final report   23 1509    Gonorrhea  Negative  Negative 23 1509    Chlamydia  Negative  Negative 23 1509    TSH        HgB A1c  ^ See Scanned report under Media Tab in Chart Review in Epic   23 1248    Varicella IgG ^ 1.1 AI  23 1248    HgB Electrophoresis         Cystic fibrosis                   Fetal testing        Test Value Reference  Range Date Time    NIPT        MSAFP  *Screen Negative*   08/14/23 1311    AFP-4                  2nd and 3rd Trimester       Test Value Reference Range Date Time    Hemoglobin (repeated)  12.2 g/dL 12.0 - 15.9 12/12/23 1816       10.8 g/dL 12.0 - 15.9 10/27/23 1516    Hematocrit (repeated)  39.2 % 34.0 - 46.6 12/12/23 1816       32.9 % 34.0 - 46.6 10/27/23 1516    Platelets   233 10*3/mm3 140 - 450 12/12/23 1816       223 10*3/mm3 140 - 450 10/27/23 1516       253 x10E3/uL 150 - 450 07/05/23 1448      ^ 280 10*3/uL 140 - 440 05/26/23 1248    GCT  79 mg/dL 65 - 139 10/27/23 1516    Antibody Screen (repeated)  Negative   12/12/23 1816       Negative  Negative 07/05/23 1448      ^ Negative   05/26/23 1248    Third Trimester syphilis screen (repeated)   Non Reactive  Non Reactive 07/05/23 1448    GTT Fasting        GTT 1 Hr        GTT 2 Hr        GTT 3 Hr        Group B Strep                  Other testing        Test Value Reference Range Date Time    [place] IgG         CMV IgG                   Drug Screening       Test Value Reference Range Date Time    Amphetamine Screen  Negative ng/mL Siemik=8419 07/05/23 1509    Barbiturate Screen  Negative ng/mL Mkhsvy=564 07/05/23 1509    Benzodiazepine Screen  Negative ng/mL Yltlsy=355 07/05/23 1509    Methadone Screen  Negative ng/mL Sbaqay=865 07/05/23 1509    Phencyclidine Screen  Negative ng/mL Cutoff=25 07/05/23 1509    Opiates Screen  Negative ng/mL Uayuzw=311 07/05/23 1509    THC Screen  Negative ng/mL Cutoff=50 07/05/23 1509    Cocaine Screen  Negative ng/mL Lddpvw=658 07/05/23 1509    Propoxyphene Screen  Negative ng/mL Zbnadh=117 07/05/23 1509    Buprenorphine Screen        Methamphetamine Screen        Oxycodone Screen        Tricyclic Antidepressants Screen                  Legend    ^: Historical                          External Prenatal Results       Pregnancy Outside Results - Transcribed From Office Records - See Scanned Records For Details       Test Value  Date Time    ABO  O  12/12/23 1816    Rh  Positive  12/12/23 1816    Antibody Screen  Negative  12/12/23 1816       Negative  07/05/23 1448      ^ Negative  05/26/23 1248    Varicella IgG ^ 1.1 AI 05/26/23 1248    Rubella  <0.90 index 07/05/23 1448    Hgb  12.2 g/dL 12/12/23 1816       10.8 g/dL 10/27/23 1516       13.5 g/dL 07/05/23 1448      ^ 14.3 g/dL 05/26/23 1248    Hct  39.2 % 12/12/23 1816       32.9 % 10/27/23 1516       40.2 % 07/05/23 1448      ^ 42.9 % 05/26/23 1248    Glucose Fasting GTT       Glucose Tolerance Test 1 hour       Glucose Tolerance Test 3 hour       Gonorrhea (discrete)  Negative  07/05/23 1509    Chlamydia (discrete)  Negative  07/05/23 1509    RPR  Non Reactive  07/05/23 1448    VDRL       Syphilis Antibody ^ <0.2 AI 05/26/23 1248    HBsAg  Negative  07/05/23 1448      ^ Nonreactive  05/26/23 1248    Herpes Simplex Virus PCR       Herpes Simplex VIrus Culture       HIV  Non Reactive  07/05/23 1448      ^ Nonreactive  05/26/23 1248    Hep C RNA Quant PCR       Hep C Antibody  Non Reactive  07/05/23 1448      ^ Nonreactive  05/26/23 1248    AFP  41.6 ng/mL 08/14/23 1311    Group B Strep       GBS Susceptibility to Clindamycin       GBS Susceptibility to Erythromycin       Fetal Fibronectin       Genetic Testing, Maternal Blood                 Drug Screening       Test Value Date Time    Urine Drug Screen       Amphetamine Screen  Negative ng/mL 07/05/23 1509    Barbiturate Screen  Negative ng/mL 07/05/23 1509    Benzodiazepine Screen  Negative ng/mL 07/05/23 1509    Methadone Screen  Negative ng/mL 07/05/23 1509    Phencyclidine Screen  Negative ng/mL 07/05/23 1509    Opiates Screen       THC Screen       Cocaine Screen       Propoxyphene Screen  Negative ng/mL 07/05/23 1509    Buprenorphine Screen       Methamphetamine Screen       Oxycodone Screen       Tricyclic Antidepressants Screen                 Legend    ^: Historical                             Past OB History:     OB History     Para Term  AB Living   1 0 0 0 0 0   SAB IAB Ectopic Molar Multiple Live Births   0 0 0 0 0 0      # Outcome Date GA Lbr Tan/2nd Weight Sex Delivery Anes PTL Lv   1 Current                Past Medical History: Past Medical History:   Diagnosis Date    Herpes       Past Surgical History Past Surgical History:   Procedure Laterality Date    EAR TUBES      FOOT NAVICULAR EXCISION OR BONE GRAFT      WISDOM TOOTH EXTRACTION        Family History: Family History   Problem Relation Age of Onset    Breast cancer Paternal Grandmother       Social History:  reports that she has never smoked. She has never used smokeless tobacco.   reports no history of alcohol use.   reports no history of drug use.         Review of Systems - Negative except per HPI for 10 point review of systems including General, Psychological, Ophthalmic, ENT, Endocrine, Respiratory, Cardiovascular, Gastrointestinal, Genito-Urinary, Musculoskeletal, Neurological, Dermatological      Objective       Vital Signs Range for the last 24 hours  Temperature: Temp:  [97.7 °F (36.5 °C)] 97.7 °F (36.5 °C)   Temp Source: Temp src: Oral   BP: BP: (119)/(70) 119/70   Pulse: Heart Rate:  [92] 92   Respirations: Resp:  [18] 18   SPO2: SpO2:  [100 %] 100 %   O2 Amount (l/min):     O2 Devices     Weight:       Physical Examination: General appearance - alert, well appearing, and in no distress  Mental status - alert, oriented to person, place, and time  Eyes - sclera anicteric, left eye normal, right eye normal  Chest - no tachypnea, retractions or cyanosis  Heart - normal rate and regular rhythm  Abdomen - soft, nontender, gravid  Pelvic - normal external genitalia, cervix 4/100/-2  Neurological - alert, oriented, normal speech  Musculoskeletal - no joint tenderness, deformity or swelling  Extremities - pedal edema trace  Skin - normal coloration and turgor, no rashes, no suspicious skin lesions noted    Presentation: Vertex   Cervix: Exam by: Method:  sterile exam per RN   Dilation: Cervical Dilation (cm): 4   Effacement: Cervical Effacement: 90%   Station:         Fetal Heart Rate Assessment   Method: Fetal HR Assessment Method: external   Beats/min: Fetal HR (beats/min): 135   Baseline: Fetal HR Baseline: normal range   Varibility: Fetal HR Variability: moderate (amplitude range 6 to 25 bpm)   Accels: Fetal HR Accelerations: greater than/equal to 15 bpm, lasting at least 15 seconds   Decels: Fetal HR Decelerations: absent   Tracing Category:       Uterine Assessment   Method: Method: external tocotransducer   Frequency (min): Contraction Frequency (Minutes): 2-3   Ctx Count in 10 min: Contractions in 10 Minutes: 4   Duration:     Intensity: Contraction Intensity: moderate by palpation   Intensity by IUPC:     Resting Tone: Uterine Resting Tone: soft by palpation   Resting Tone by IUPC:     [place] Units:       Lab Results   Component Value Date    WBC 12.75 (H) 12/12/2023    HGB 12.2 12/12/2023    HCT 39.2 12/12/2023    MCV 78.7 (L) 12/12/2023     12/12/2023      US: Intrauterine pregnancy at 36w5d  Interval growth and BPP  Normal Interval growth  EFW:44.1%ile, AC:60.1%ile  Fetal position: Vertex  Biophysical profile: Non-reassuring  4/8  BPP other: -2 for tone and -2 for breathing  MARK: 13.44 cm    Assessment & Plan   Assessment:  1.  Intrauterine pregnancy at 37w0d weeks gestation with reactive, reassuring fetal status.    2.  Spontaneous labor, now with SROM  3.  GBS status: pending, Penicillin ordered    Plan:  1. Patient now comfortable with an epidural, cervix 4/100/-2.  2. Plan of care has been reviewed with patient and family.  Risks, benefits of treatment plan have been discussed.  All questions have been answered.      Vanessa Serna MD  12/14/2023  08:50 EST

## 2023-12-14 NOTE — ANESTHESIA PREPROCEDURE EVALUATION
Anesthesia Evaluation     Patient summary reviewed                Airway   No difficulty expected  Dental      Pulmonary    Cardiovascular     Rhythm: regular        Neuro/Psych  GI/Hepatic/Renal/Endo      Musculoskeletal     Abdominal    Substance History      OB/GYN          Other                    Anesthesia Plan    ASA 2     epidural       Anesthetic plan, risks, benefits, and alternatives have been provided, discussed and informed consent has been obtained with: patient.    CODE STATUS:    Level Of Support Discussed With: Patient  Code Status (Patient has no pulse and is not breathing): CPR (Attempt to Resuscitate)  Medical Interventions (Patient has pulse or is breathing): Full Support

## 2023-12-14 NOTE — LACTATION NOTE
P1, 37 weeks-new admission-infant currently in NICU.  HGP and supplies provided to patient.  Patient currently with several visitors, will call when ready for pump demo.  LC number on WB, will follow as needed.

## 2023-12-14 NOTE — NURSING NOTE
Patient sitting on edge of bed for epidural placement from [id number]. FHR monitor tracing maternal heart rate. RN at bedside.

## 2023-12-15 LAB
BACTERIA SPEC AEROBE CULT: NO GROWTH
BASOPHILS # BLD AUTO: 0.04 10*3/MM3 (ref 0–0.2)
BASOPHILS NFR BLD AUTO: 0.2 % (ref 0–1.5)
DEPRECATED RDW RBC AUTO: 37.5 FL (ref 37–54)
EOSINOPHIL # BLD AUTO: 0.01 10*3/MM3 (ref 0–0.4)
EOSINOPHIL NFR BLD AUTO: 0.1 % (ref 0.3–6.2)
ERYTHROCYTE [DISTWIDTH] IN BLOOD BY AUTOMATED COUNT: 13.1 % (ref 12.3–15.4)
HCT VFR BLD AUTO: 31.6 % (ref 34–46.6)
HGB BLD-MCNC: 10.5 G/DL (ref 12–15.9)
IMM GRANULOCYTES # BLD AUTO: 0.18 10*3/MM3 (ref 0–0.05)
IMM GRANULOCYTES NFR BLD AUTO: 0.9 % (ref 0–0.5)
LYMPHOCYTES # BLD AUTO: 1.37 10*3/MM3 (ref 0.7–3.1)
LYMPHOCYTES NFR BLD AUTO: 7 % (ref 19.6–45.3)
MCH RBC QN AUTO: 26.3 PG (ref 26.6–33)
MCHC RBC AUTO-ENTMCNC: 33.2 G/DL (ref 31.5–35.7)
MCV RBC AUTO: 79 FL (ref 79–97)
MONOCYTES # BLD AUTO: 0.66 10*3/MM3 (ref 0.1–0.9)
MONOCYTES NFR BLD AUTO: 3.4 % (ref 5–12)
NEUTROPHILS NFR BLD AUTO: 17.25 10*3/MM3 (ref 1.7–7)
NEUTROPHILS NFR BLD AUTO: 88.4 % (ref 42.7–76)
NRBC BLD AUTO-RTO: 0 /100 WBC (ref 0–0.2)
PLATELET # BLD AUTO: 186 10*3/MM3 (ref 140–450)
PMV BLD AUTO: 11.3 FL (ref 6–12)
RBC # BLD AUTO: 4 10*6/MM3 (ref 3.77–5.28)
WBC NRBC COR # BLD AUTO: 19.51 10*3/MM3 (ref 3.4–10.8)

## 2023-12-15 PROCEDURE — 25810000003 LACTATED RINGERS SOLUTION: Performed by: OBSTETRICS & GYNECOLOGY

## 2023-12-15 PROCEDURE — 85025 COMPLETE CBC W/AUTO DIFF WBC: CPT | Performed by: OBSTETRICS & GYNECOLOGY

## 2023-12-15 PROCEDURE — 0503F POSTPARTUM CARE VISIT: CPT | Performed by: NURSE PRACTITIONER

## 2023-12-15 RX ADMIN — TRAMADOL HYDROCHLORIDE 50 MG: 50 TABLET, COATED ORAL at 23:32

## 2023-12-15 RX ADMIN — TRAMADOL HYDROCHLORIDE 50 MG: 50 TABLET, COATED ORAL at 15:13

## 2023-12-15 RX ADMIN — Medication: at 15:10

## 2023-12-15 RX ADMIN — Medication 1 TABLET: at 10:56

## 2023-12-15 RX ADMIN — DOCUSATE SODIUM 100 MG: 100 CAPSULE, LIQUID FILLED ORAL at 10:56

## 2023-12-15 RX ADMIN — IBUPROFEN 600 MG: 600 TABLET ORAL at 15:10

## 2023-12-15 RX ADMIN — SODIUM CHLORIDE, POTASSIUM CHLORIDE, SODIUM LACTATE AND CALCIUM CHLORIDE 500 ML: 600; 310; 30; 20 INJECTION, SOLUTION INTRAVENOUS at 00:21

## 2023-12-15 RX ADMIN — IBUPROFEN 600 MG: 600 TABLET ORAL at 21:25

## 2023-12-15 RX ADMIN — ACETAMINOPHEN 650 MG: 325 TABLET, FILM COATED ORAL at 19:41

## 2023-12-15 RX ADMIN — DOCUSATE SODIUM 100 MG: 100 CAPSULE, LIQUID FILLED ORAL at 21:25

## 2023-12-15 NOTE — PLAN OF CARE
Goal Outcome Evaluation:      VSS, decreased BP physician notified and verbal order to administer IV fluid bolus 500 ml and continue to monitor for further symptoms, pt experiencing intermittent nausea and vomiting, pt ambulated x 1 and able to void. Pt experiencing continuous headache and receiving pain medication PRN.

## 2023-12-15 NOTE — PROGRESS NOTES
Postpartum Progress Note      Status post Vaginal Delivery: Doing well postoperatively.     1) Postpartum care immediately following delivery :    Routine care, continue current care.    2) Leukocytosis: WBC 20.40 on admission, 19.51 this morning. Afebrile. Abdomin is soft and non tender, will continue to monitor    Rh status: O+  Rubella: Not immune. MMR ordered  Gender: Female    Subjective     Postpartum Day 1: Vaginal delivery    The patient feels well. The patient denies emotional concerns. Pain is well controlled with current medications. The baby is well. The patient is ambulating well. The patient is tolerating a normal diet.     Objective     Vital signs in last 24 hours:  Temp:  [97 °F (36.1 °C)-99.3 °F (37.4 °C)] 98.6 °F (37 °C)  Heart Rate:  [] 116  Resp:  [16-18] 16  BP: ()/() 104/67      General:    alert, appears stated age, and cooperative   CV: RRR, no m/r/g   Lungs: CTAB   Abdomen:  Soft, Non-tender    Lochia:  appropriate   Uterine Fundus:   firm   Ext    Edema trace   DVT Evaluation:  No evidence of DVT seen on physical exam.     Lab Results   Component Value Date    WBC 19.51 (H) 12/15/2023    HGB 10.5 (L) 12/15/2023    HCT 31.6 (L) 12/15/2023    MCV 79.0 12/15/2023     12/15/2023       Yessica Fletcher, APRN  12/15/2023  09:06 EST

## 2023-12-15 NOTE — LACTATION NOTE
P1. Baby in NICU. Pt reports she hasn't pumped yet. Set up hgp with instructions on use and cleaning. Encouraged pt to pump every 3 hours for 15 min, label colostrum with date and time and take to NICU within one hour of pumping. Pt reports insurance will be mailing pump but not until . Encouraged pt to speak with insurance and let them know she delivered so they can send pump soon. Pt denies questions at this time. Encouraged to call LC as needed.    Lactation Consult Note    Evaluation Completed: 12/15/2023 11:39 EST  Patient Name: Mirta Pierce  :  2005  MRN:  [id number]     REFERRAL  INFORMATION:                                         DELIVERY HISTORY:        Skin to skin initiation date/time:      Skin to skin end date/time:           MATERNAL ASSESSMENT:                               INFANT ASSESSMENT:  Information for the patient's :  Jose Alfredo Pierce [[id number]]   No past medical history on file.                                                                                                   MATERNAL INFANT FEEDING:                                                                       EQUIPMENT TYPE:                                 BREAST PUMPING:          LACTATION REFERRALS:

## 2023-12-15 NOTE — PROGRESS NOTES
"Discharge Planning Assessment  [facility]     Patient Name: Mirta Pierce  MRN: [id number]  Today's Date: 12/15/2023    Admit Date: 12/14/2023    Plan: Infant may discharge to mother when medically ready. EDITA Chávez.   Discharge Needs Assessment    No documentation.                  Discharge Plan       Row Name 12/15/23 1128       Plan    Plan Infant may discharge to mother when medically ready. EDITA Chávez.    Plan Comments Mother: Mirta Pierce, MRN: [id number]; infant: Jose Alfredo \"Brayley\" Stan, MRN: [id number]. CSW consulted for \"NICU admission 37 wks.\" Of note, no toxicology screens were ordered for mother or infant as need was not warranted at this time. CSW met with mother at bedside while father of infant/fiancé was in the room. Mother gave consent for father to be present during assessment. Mother verified address, phone number, and insurance. Mother reports [employer] has spoken to her about adding infant to health insurance. Mother reports having a car seat, crib/bassinet, clothes, and diapers for infant. This is mother and father's first baby. Mother reports, maternal grandparents, paternal grandparents, father of infant/fiancé, and other family members are available for support as needed. Infant's pediatrician is [facility]; CSW provided mother with a list of local pediatricians. Mother is comfortable scheduling appointments for infant and has transportation. Mother is current with [facility] and reports the hospitals rep has not met with her. CSW consulted the hospitals rep. CSW asked how mother and father were coping with infant's NICU admission. Mother and father reported they are struggling. Father stated, \"it's been really hard to see her with all the tubes and IVs, I'm not coping well at all. We haven't been able to hold her yet and that's tough. I feel like Garrison got snatched from us.\" Mother stated, \"It's been difficult not being able to hold her. All of this has been unexpected and " "overwhelming.\" CSW provided active listening and validation to mother and father. CSW spent time processing infant's NICU admission and discussing resources available with mother and father. CSW spoke about PPD/A and emphasized the importance of self-care for NICU parents. Mother and father voiced understanding. CSW explained her role as NICU  and encouraged mother and father to reach out if needed. CSW provided mother with a packet of resources including: WIC, HANDS, transportation, infant supplies, counseling, online support groups, postpartum mood and anxiety resources, NICU parent resources, and general community resources. CSW spent time building rapport with mother and father, and offered validation, support, and encouragement to parents throughout assessment. Mother and father were polite and appropriate, and denied having unmet needs or concerns at this time. CSW will remain available for psychosocial needs while infant is in the NICU. EDITA Chávez.                  Continued Care and Services - Admitted Since 12/14/2023    Coordination has not been started for this encounter.       Expected Discharge Date and Time       Expected Discharge Date Expected Discharge Time    Dec 15, 2023            Demographic Summary       Row Name 12/15/23 1126       General Information    Admission Type inpatient    Arrived From home    Referral Source nursing    Reason for Consult other (see comments)    General Information Comments NICU admission.                   Functional Status       Row Name 12/15/23 1126       Functional Status, IADL    Medications independent    Meal Preparation independent    Housekeeping independent    Laundry independent    Shopping independent       Mental Status    General Appearance WDL WDL       Mental Status Summary    Recent Changes in Mental Status/Cognitive Functioning no changes                   Psychosocial       Row Name 12/15/23 1127       Behavior WDL    Behavior WDL " WDL       Emotion Mood WDL    Emotion/Mood/Affect WDL WDL       Speech WDL    Speech WDL WDL       Perceptual State WDL    Perceptual State WDL WDL       Thought Process WDL    Thought Process WDL WDL       Intellectual Performance WDL    Intellectual Performance WDL WDL       Coping/Stress    Major Change/Loss/Stressor birth;family/significant other illness    Patient Personal Strengths future/goal oriented;motivated;positive attitude;strong support system    Sources of Support other family members;parent(s);significant other                   Abuse/Neglect       Row Name 12/15/23 1127       Personal Safety    Physical Signs of Abuse Present no                   Legal    No documentation.                  Substance Abuse       Row Name 12/15/23 1127       Substance Use    Substance Use Comment No toxicology screens were ordered for mother or infant as need was not warranted at this time.                   Patient Forms    No documentation.                     ELISHA Womack

## 2023-12-15 NOTE — ANESTHESIA POSTPROCEDURE EVALUATION
Patient: Mirta Pierce    Procedure Summary       Date: 12/14/23 Room / Location:     Anesthesia Start: 0921 Anesthesia Stop: 1506    Procedure: LABOR ANALGESIA Diagnosis:     Scheduled Providers:  Provider: Conrad Mejia MD    Anesthesia Type: epidural ASA Status: 2            Anesthesia Type: epidural    Vitals  Vitals Value Taken Time   /78 12/14/23 1921   Temp 36.1 °C (97 °F) 12/14/23 1921   Pulse 95 12/14/23 1921   Resp 16 12/14/23 1921   SpO2             Post Anesthesia Care and Evaluation    Anesthetic complications: No anesthetic complications

## 2023-12-16 VITALS
TEMPERATURE: 98.1 F | DIASTOLIC BLOOD PRESSURE: 67 MMHG | BODY MASS INDEX: 25.46 KG/M2 | SYSTOLIC BLOOD PRESSURE: 102 MMHG | HEART RATE: 86 BPM | RESPIRATION RATE: 16 BRPM | HEIGHT: 62 IN | OXYGEN SATURATION: 100 %

## 2023-12-16 LAB
B-HEM STREP SPEC QL CULT: NEGATIVE
BASOPHILS # BLD AUTO: 0.06 10*3/MM3 (ref 0–0.2)
BASOPHILS NFR BLD AUTO: 0.4 % (ref 0–1.5)
DEPRECATED RDW RBC AUTO: 37.1 FL (ref 37–54)
EOSINOPHIL # BLD AUTO: 0.23 10*3/MM3 (ref 0–0.4)
EOSINOPHIL NFR BLD AUTO: 1.7 % (ref 0.3–6.2)
ERYTHROCYTE [DISTWIDTH] IN BLOOD BY AUTOMATED COUNT: 13.2 % (ref 12.3–15.4)
HCT VFR BLD AUTO: 30.1 % (ref 34–46.6)
HGB BLD-MCNC: 9.4 G/DL (ref 12–15.9)
IMM GRANULOCYTES # BLD AUTO: 0.12 10*3/MM3 (ref 0–0.05)
IMM GRANULOCYTES NFR BLD AUTO: 0.9 % (ref 0–0.5)
LYMPHOCYTES # BLD AUTO: 2.26 10*3/MM3 (ref 0.7–3.1)
LYMPHOCYTES NFR BLD AUTO: 16.3 % (ref 19.6–45.3)
MCH RBC QN AUTO: 24.4 PG (ref 26.6–33)
MCHC RBC AUTO-ENTMCNC: 31.2 G/DL (ref 31.5–35.7)
MCV RBC AUTO: 78 FL (ref 79–97)
MONOCYTES # BLD AUTO: 0.56 10*3/MM3 (ref 0.1–0.9)
MONOCYTES NFR BLD AUTO: 4 % (ref 5–12)
NEUTROPHILS NFR BLD AUTO: 10.61 10*3/MM3 (ref 1.7–7)
NEUTROPHILS NFR BLD AUTO: 76.7 % (ref 42.7–76)
NRBC BLD AUTO-RTO: 0 /100 WBC (ref 0–0.2)
PLATELET # BLD AUTO: 220 10*3/MM3 (ref 140–450)
PMV BLD AUTO: 10.9 FL (ref 6–12)
RBC # BLD AUTO: 3.86 10*6/MM3 (ref 3.77–5.28)
WBC NRBC COR # BLD AUTO: 13.84 10*3/MM3 (ref 3.4–10.8)

## 2023-12-16 PROCEDURE — 90471 IMMUNIZATION ADMIN: CPT | Performed by: NURSE PRACTITIONER

## 2023-12-16 PROCEDURE — 25010000002 MEASLES, MUMPS & RUBELLA VAC RECONSTITUTED SOLUTION: Performed by: NURSE PRACTITIONER

## 2023-12-16 PROCEDURE — 0503F POSTPARTUM CARE VISIT: CPT | Performed by: OBSTETRICS & GYNECOLOGY

## 2023-12-16 PROCEDURE — 90707 MMR VACCINE SC: CPT | Performed by: NURSE PRACTITIONER

## 2023-12-16 PROCEDURE — 85025 COMPLETE CBC W/AUTO DIFF WBC: CPT | Performed by: OBSTETRICS & GYNECOLOGY

## 2023-12-16 RX ORDER — HYDROCODONE BITARTRATE AND ACETAMINOPHEN 5; 325 MG/1; MG/1
1 TABLET ORAL EVERY 6 HOURS PRN
Status: DISCONTINUED | OUTPATIENT
Start: 2023-12-16 | End: 2023-12-16 | Stop reason: HOSPADM

## 2023-12-16 RX ORDER — ONDANSETRON 4 MG/1
4 TABLET, FILM COATED ORAL EVERY 6 HOURS PRN
Status: DISCONTINUED | OUTPATIENT
Start: 2023-12-16 | End: 2023-12-16 | Stop reason: HOSPADM

## 2023-12-16 RX ORDER — HYDROCODONE BITARTRATE AND ACETAMINOPHEN 5; 325 MG/1; MG/1
1 TABLET ORAL EVERY 6 HOURS PRN
Qty: 6 TABLET | Refills: 0 | Status: SHIPPED | OUTPATIENT
Start: 2023-12-16 | End: 2023-12-23

## 2023-12-16 RX ADMIN — HYDROCODONE BITARTRATE AND ACETAMINOPHEN 1 TABLET: 5; 325 TABLET ORAL at 15:41

## 2023-12-16 RX ADMIN — MEASLES, MUMPS, AND RUBELLA VIRUS VACCINE LIVE 0.5 ML: 1000; 12500; 1000 INJECTION, POWDER, LYOPHILIZED, FOR SUSPENSION SUBCUTANEOUS at 15:41

## 2023-12-16 RX ADMIN — TRAMADOL HYDROCHLORIDE 50 MG: 50 TABLET, COATED ORAL at 09:27

## 2023-12-16 RX ADMIN — HYDROXYZINE HYDROCHLORIDE 50 MG: 50 TABLET ORAL at 03:36

## 2023-12-16 RX ADMIN — Medication: at 09:35

## 2023-12-16 RX ADMIN — DOCUSATE SODIUM 100 MG: 100 CAPSULE, LIQUID FILLED ORAL at 09:27

## 2023-12-16 RX ADMIN — ACETAMINOPHEN 650 MG: 325 TABLET, FILM COATED ORAL at 09:27

## 2023-12-16 RX ADMIN — Medication 1 TABLET: at 09:27

## 2023-12-16 RX ADMIN — ACETAMINOPHEN 650 MG: 325 TABLET, FILM COATED ORAL at 01:59

## 2023-12-16 RX ADMIN — IBUPROFEN 600 MG: 600 TABLET ORAL at 03:01

## 2023-12-16 NOTE — PROGRESS NOTES
"Subjective:    Cc:  Postpartum    Postpartum Day 2:     The patient feels well.  Pain is well controlled with current medications. The baby is well.  Urinary output is adequate. The patient is ambulating well. The patient is tolerating a normal diet. Flatus has been passed.      Objective:    Vital signs in last 24 hours:  Blood pressure 102/67, pulse 86, temperature 98.1 °F (36.7 °C), temperature source Oral, resp. rate 16, height 157.5 cm (62\"), last menstrual period 03/30/2023, SpO2 100%, currently breastfeeding.      General:    alert, appears stated age, and cooperative   Uterine Fundus:   firm     Labs    Rh + / Female infant    Assessment/Plan:.     Postpartum Day #2  - Discharge home.  Reviewed instructions, restrictions and follow up.      Bossman Coelho MD     "

## 2023-12-16 NOTE — PLAN OF CARE
Goal Outcome Evaluation:  Plan of Care Reviewed With: patient        Progress: improving  Outcome Evaluation: Pt progressing as expected, c/o pain related to uterine cramping and perineal pain- encouraged to alternate ordered modalities for pain control instead of just taking a dose occasionally to better achieve relief. verbal understanding received. Lochia and fundus remain WDL. Pt would like to board after discharge as infant in NICU.

## 2023-12-18 NOTE — PROGRESS NOTES
"Continued Stay Note  [facility]     Patient Name: Mirta Pierce  MRN: [id number]  Today's Date: 12/18/2023    Admit Date: 12/14/2023    Plan: Infant may discharge to mother when medically ready; CSW will follow cord. EDITA Chávez.   Discharge Plan       Row Name 12/18/23 2646       Plan    Plan Infant may discharge to mother when medically ready; CSW will follow cord. EDITA Chávez.    Plan Comments Mother: Mirta Pierce, MRN: [id number]; infant: MirtasGirl \"Brayley\" Stan, MRN: [id number]. CSW was informed that father of infant, Isidoro Rob had two [facility]  show up to the mother and baby unit today (12/18) and arrest him. Per security team at [facility] Isidoro ANDRADE had six warrants, three from different counties, all related to drug and alcohol. CSW met with mother at bedside while maternal grandparents and maternal sister were in the room. Mother gave consent for family members to be present during conversation. Mother tearfully shared she lives alone with FOB and feels safe with him. Mother declined needing resources, support, or wanting to talk about FOB at this time. CSW respected mother's wishes. Maternal grandpa asked to meet with CSW alone in the hallway. CSW agreed. MGP shared he and MGM were unaware of FOB's warrants and feel \"overwhelmed\" because they are unsure how to support their daughter. CSW provided active listening, support, and validation to MGP. Maternal grandpa asked for resources for mother. CSW provided MGP with a packet of resources including: HANDS, counseling, [facility], online support groups, and postpartum mood and anxiety resources. MGP thanked CSW, and shared mother will be living with them for a while. CSW praised MGP for being a great support for mother. CSW submitted a CPS report (Web ID # [id number]). CSW will follow cord toxicology and complete mandated reporting to CPS if warranted. EDITA Chávez.      Row Name 12/18/23 5542       Plan    " "Plan Infant may discharge to mother when medically ready; CSW will follow cord. EDITA Chávez.    Plan Comments Mother: Mirta Pierce, MRN: [id number]; infant: Davonirl \"Brayley\" Stan, MRN: [id number]. CSW met with mother and father on 12/15, and parents presented well during assessment. CSW was informed by NICU and nursery RN of erratic/strange behavior (cussing, sweating, jittery, etc.)  from dad the last few days. CSW made the decision to send the cord toxicology on infant due to suspicious behavior from father. CSW will follow cord toxicology and complete mandated reporting to CPS if warranted. EDITA Chávez.                   Discharge Codes    No documentation.                 Expected Discharge Date and Time       Expected Discharge Date Expected Discharge Time    Dec 16, 2023               ELISHA Womack    "

## 2023-12-18 NOTE — PROGRESS NOTES
"Continued Stay Note  [facility]     Patient Name: Mirta Pierce  MRN: [id number]  Today's Date: 12/18/2023    Admit Date: 12/14/2023    Plan: Infant may discharge to mother when medically ready; CSW will follow cord. EDITA Chávez.   Discharge Plan       Row Name 12/18/23 1115       Plan    Plan Infant may discharge to mother when medically ready; CSW will follow cord. EDITA Chávez.    Plan Comments Mother: Mirta Pierce, MRN: [id number]; infant: Davonirja \"Brayley\" Stan, MRN: [id number]. CSW met with mother and father on 12/15, and parents presented well during assessment. CSW was informed by NICU and nursery RN of erratic/strange behavior (cussing, sweating, jittery, etc.)  from dad the last few days. CSW made the decision to send the cord toxicology on infant due to suspicious behavior from father. CSW will follow cord toxicology and complete mandated reporting to CPS if warranted. EDITA Chávez.                   Discharge Codes    No documentation.                 Expected Discharge Date and Time       Expected Discharge Date Expected Discharge Time    Dec 16, 2023               ELISHA Womack    "

## 2023-12-21 NOTE — PROGRESS NOTES
"Continued Stay Note  [facility]     Patient Name: Mirta Pierce  MRN: [id number]  Today's Date: 12/21/2023    Admit Date: 12/14/2023    Plan: Infant may discharge to mother when medically ready; CSW will follow cord. EDITA Chávez.   Discharge Plan       Row Name 12/21/23 1437       Plan    Plan Comments Mother: Mirta Pierce, MRN: [id number]; infant: HaltdsGirl \"Brayley\" Stan, MRN: [id number]. CSW has reviewed infant's cord toxicology results and infant's cord was negative for substances. CSW emailed a copy of infant's cord toxicology results to CPS worker, Barb Lux. EDITA Chávez.                   Discharge Codes    No documentation.                 Expected Discharge Date and Time       Expected Discharge Date Expected Discharge Time    Dec 16, 2023               ELISHA Womack    "

## 2023-12-21 NOTE — PROGRESS NOTES
"Continued Stay Note  [facility]     Patient Name: Mirta Pierce  MRN: [id number]  Today's Date: 12/21/2023    Admit Date: 12/14/2023    Plan: Infant may discharge to mother when medically ready; CSW will follow cord. EDITA Chávez.   Discharge Plan       Row Name 12/21/23 1441       Plan    Plan Comments Mother: Mirta Pierce, MRN: [id number]; infant: HaleysGirl \"Brayley\" Stan, MRN: [id number]. CSW has reviewed infant's cord toxicology results and infant's cord was negative for substances. CSW emailed a copy of infant's cord toxicology results to CPS worker, Barb Lux. EDITA Chávez.      Row Name 12/21/23 1437       Plan    Plan Comments Mother: Mirta Pierce, MRN: [id number]; infant: HaleysGirl \"Brayley\" Stan, MRN: [id number]. CSW has reviewed infant's cord toxicology results and infant's cord was negative for substances. CSW emailed a copy of infant's cord toxicology results to CPS worker, Barb Lux. EDITA Chávez.                   Discharge Codes    No documentation.                 Expected Discharge Date and Time       Expected Discharge Date Expected Discharge Time    Dec 16, 2023               ELISHA Womack    "

## 2024-10-09 ENCOUNTER — APPOINTMENT (OUTPATIENT)
Dept: ULTRASOUND IMAGING | Facility: HOSPITAL | Age: 19
End: 2024-10-09
Payer: COMMERCIAL

## 2024-10-09 ENCOUNTER — HOSPITAL ENCOUNTER (EMERGENCY)
Facility: HOSPITAL | Age: 19
Discharge: HOME | End: 2024-10-09
Attending: EMERGENCY MEDICINE
Payer: COMMERCIAL

## 2024-10-09 VITALS
HEART RATE: 105 BPM | RESPIRATION RATE: 18 BRPM | DIASTOLIC BLOOD PRESSURE: 70 MMHG | TEMPERATURE: 98 F | BODY MASS INDEX: 20.24 KG/M2 | HEIGHT: 62 IN | OXYGEN SATURATION: 99 % | SYSTOLIC BLOOD PRESSURE: 114 MMHG | WEIGHT: 110 LBS

## 2024-10-09 DIAGNOSIS — R10.2 PELVIC PAIN: Primary | ICD-10-CM

## 2024-10-09 LAB
ALBUMIN SERPL-MCNC: 4.2 G/DL (ref 3.5–5.2)
ALBUMIN/GLOB SERPL: 1.8 G/DL
ALP SERPL-CCNC: 85 U/L (ref 39–117)
ALT SERPL W P-5'-P-CCNC: 10 U/L (ref 1–33)
ANION GAP SERPL CALCULATED.3IONS-SCNC: 9 MMOL/L (ref 5–15)
AST SERPL-CCNC: 8 U/L (ref 1–32)
BACTERIA UR QL AUTO: ABNORMAL /HPF
BASOPHILS # BLD AUTO: 0.01 10*3/MM3 (ref 0–0.2)
BASOPHILS NFR BLD AUTO: 0.2 % (ref 0–1.5)
BILIRUB SERPL-MCNC: 0.2 MG/DL (ref 0–1.2)
BILIRUB UR QL STRIP: NEGATIVE
BUN SERPL-MCNC: 11 MG/DL (ref 6–20)
BUN/CREAT SERPL: 20 (ref 7–25)
CALCIUM SPEC-SCNC: 9.2 MG/DL (ref 8.6–10.5)
CHLORIDE SERPL-SCNC: 103 MMOL/L (ref 98–107)
CLARITY UR: CLEAR
CLUE CELLS SPEC QL WET PREP: NORMAL
CO2 SERPL-SCNC: 27 MMOL/L (ref 22–29)
COLOR UR: YELLOW
CREAT SERPL-MCNC: 0.55 MG/DL (ref 0.57–1)
DEPRECATED RDW RBC AUTO: 40.8 FL (ref 37–54)
EGFRCR SERPLBLD CKD-EPI 2021: 133.9 ML/MIN/1.73
EOSINOPHIL # BLD AUTO: 0.01 10*3/MM3 (ref 0–0.4)
EOSINOPHIL NFR BLD AUTO: 0.2 % (ref 0.3–6.2)
ERYTHROCYTE [DISTWIDTH] IN BLOOD BY AUTOMATED COUNT: 12.6 % (ref 12.3–15.4)
GLOBULIN UR ELPH-MCNC: 2.4 GM/DL
GLUCOSE SERPL-MCNC: 71 MG/DL (ref 65–99)
GLUCOSE UR STRIP-MCNC: NEGATIVE MG/DL
HCG SERPL QL: NEGATIVE
HCT VFR BLD AUTO: 45.1 % (ref 34–46.6)
HGB BLD-MCNC: 14.2 G/DL (ref 12–15.9)
HGB UR QL STRIP.AUTO: NEGATIVE
HYALINE CASTS UR QL AUTO: ABNORMAL /LPF
HYDATID CYST SPEC WET PREP: NORMAL
IMM GRANULOCYTES # BLD AUTO: 0.01 10*3/MM3 (ref 0–0.05)
IMM GRANULOCYTES NFR BLD AUTO: 0.2 % (ref 0–0.5)
KETONES UR QL STRIP: NEGATIVE
LEUKOCYTE ESTERASE UR QL STRIP.AUTO: ABNORMAL
LIPASE SERPL-CCNC: 23 U/L (ref 13–60)
LYMPHOCYTES # BLD AUTO: 1.52 10*3/MM3 (ref 0.7–3.1)
LYMPHOCYTES NFR BLD AUTO: 28.8 % (ref 19.6–45.3)
MCH RBC QN AUTO: 27.7 PG (ref 26.6–33)
MCHC RBC AUTO-ENTMCNC: 31.5 G/DL (ref 31.5–35.7)
MCV RBC AUTO: 87.9 FL (ref 79–97)
MONOCYTES # BLD AUTO: 0.22 10*3/MM3 (ref 0.1–0.9)
MONOCYTES NFR BLD AUTO: 4.2 % (ref 5–12)
NEUTROPHILS NFR BLD AUTO: 3.51 10*3/MM3 (ref 1.7–7)
NEUTROPHILS NFR BLD AUTO: 66.4 % (ref 42.7–76)
NITRITE UR QL STRIP: NEGATIVE
NRBC BLD AUTO-RTO: 0 /100 WBC (ref 0–0.2)
PH UR STRIP.AUTO: 6.5 [PH] (ref 5–8)
PLATELET # BLD AUTO: 244 10*3/MM3 (ref 140–450)
PMV BLD AUTO: 9.4 FL (ref 6–12)
POTASSIUM SERPL-SCNC: 3.7 MMOL/L (ref 3.5–5.2)
PROT SERPL-MCNC: 6.6 G/DL (ref 6–8.5)
PROT UR QL STRIP: NEGATIVE
RBC # BLD AUTO: 5.13 10*6/MM3 (ref 3.77–5.28)
RBC # UR STRIP: ABNORMAL /HPF
REF LAB TEST METHOD: ABNORMAL
SODIUM SERPL-SCNC: 139 MMOL/L (ref 136–145)
SP GR UR STRIP: 1.01 (ref 1–1.03)
SQUAMOUS #/AREA URNS HPF: ABNORMAL /HPF
T VAGINALIS SPEC QL WET PREP: NORMAL
UROBILINOGEN UR QL STRIP: ABNORMAL
WBC # UR STRIP: ABNORMAL /HPF
WBC NRBC COR # BLD AUTO: 5.28 10*3/MM3 (ref 3.4–10.8)
WBC SPEC QL WET PREP: NORMAL
YEAST GENITAL QL WET PREP: NORMAL

## 2024-10-09 PROCEDURE — 87491 CHLMYD TRACH DNA AMP PROBE: CPT | Performed by: PHYSICIAN ASSISTANT

## 2024-10-09 PROCEDURE — 87591 N.GONORRHOEAE DNA AMP PROB: CPT | Performed by: PHYSICIAN ASSISTANT

## 2024-10-09 PROCEDURE — 36415 COLL VENOUS BLD VENIPUNCTURE: CPT | Performed by: PHYSICIAN ASSISTANT

## 2024-10-09 PROCEDURE — 76830 TRANSVAGINAL US NON-OB: CPT

## 2024-10-09 PROCEDURE — 81001 URINALYSIS AUTO W/SCOPE: CPT | Performed by: PHYSICIAN ASSISTANT

## 2024-10-09 PROCEDURE — 87210 SMEAR WET MOUNT SALINE/INK: CPT | Performed by: PHYSICIAN ASSISTANT

## 2024-10-09 PROCEDURE — 80053 COMPREHEN METABOLIC PANEL: CPT | Performed by: PHYSICIAN ASSISTANT

## 2024-10-09 PROCEDURE — 84703 CHORIONIC GONADOTROPIN ASSAY: CPT | Performed by: PHYSICIAN ASSISTANT

## 2024-10-09 PROCEDURE — 93976 VASCULAR STUDY: CPT

## 2024-10-09 PROCEDURE — 76856 US EXAM PELVIC COMPLETE: CPT

## 2024-10-09 PROCEDURE — 85025 COMPLETE CBC W/AUTO DIFF WBC: CPT | Performed by: PHYSICIAN ASSISTANT

## 2024-10-09 PROCEDURE — 99284 EMERGENCY DEPT VISIT MOD MDM: CPT

## 2024-10-09 PROCEDURE — 83690 ASSAY OF LIPASE: CPT | Performed by: PHYSICIAN ASSISTANT

## 2024-10-09 RX ORDER — ERGOCALCIFEROL 1.25 MG/1
50000 CAPSULE, LIQUID FILLED ORAL
COMMUNITY

## 2024-10-09 RX ORDER — DOXYCYCLINE 100 MG/1
1 CAPSULE ORAL EVERY 12 HOURS SCHEDULED
COMMUNITY
Start: 2024-10-02

## 2024-10-09 RX ORDER — DESVENLAFAXINE 50 MG/1
50 TABLET, FILM COATED, EXTENDED RELEASE ORAL DAILY
COMMUNITY

## 2024-10-09 NOTE — ED PROVIDER NOTES
EMERGENCY DEPARTMENT ENCOUNTER  Room Number:  S01/01  PCP: Easton Colon MD  Independent Historians: Patient      HPI:  Chief Complaint: had concerns including Pelvic Pain.     A complete HPI/ROS/PMH/PSH/SH/FH are unobtainable due to: None    Chronic or social conditions impacting patient care (Social Determinants of Health): None      Context: The patient is a  19 y.o. female with a medical history of herpes genitalis who presents to the ED c/o acute lower abdominal pain ongoing for the past 2 days.  Patient reports 2 days ago she developed constant crampy lower abdominal/pelvic pain with intermittent sharp pain localized to the LLQ.  She notes associated nausea, 1 episode of nonbilious nonbloody emesis yesterday, subjective fever and chills. Last BM was yesterday.  No diarrhea, constipation, or blood in stool. Denies any change in vaginal discharge, abnormal bleeding, urinary symptoms, history of STI. LMP was on 9/21/24.  Patient does not follow with GYN, no prior Pap smear.        Review of prior external notes (non-ED) -and- Review of prior external test results outside of this encounter: Patient was last admitted 12/14/24 to labor and delivery, at 37 weeks with delivery of a female infant.         PAST MEDICAL HISTORY  Active Ambulatory Problems     Diagnosis Date Noted    Maternal anemia in pregnancy, antepartum 10/30/2023    History of herpes genitalis 11/15/2023    Decreased fetal movement, third trimester, fetus 1 12/13/2023     Resolved Ambulatory Problems     Diagnosis Date Noted    Pregnancy 12/12/2023     Past Medical History:   Diagnosis Date    Herpes          PAST SURGICAL HISTORY  Past Surgical History:   Procedure Laterality Date    EAR TUBES      FOOT NAVICULAR EXCISION OR BONE GRAFT      WISDOM TOOTH EXTRACTION           FAMILY HISTORY  Family History   Problem Relation Age of Onset    Breast cancer Paternal Grandmother          SOCIAL HISTORY  Social History     Socioeconomic History     Marital status: Single   Tobacco Use    Smoking status: Never    Smokeless tobacco: Never   Vaping Use    Vaping status: Never Used   Substance and Sexual Activity    Alcohol use: Never    Drug use: Never    Sexual activity: Yes         ALLERGIES  Patient has no known allergies.      REVIEW OF SYSTEMS  Review of Systems  Included in HPI  All systems reviewed and negative except for those discussed in HPI.      PHYSICAL EXAM    I have reviewed the triage vital signs and nursing notes.    ED Triage Vitals   Temp Heart Rate Resp BP SpO2   10/09/24 1046 10/09/24 1046 10/09/24 1046 10/09/24 1047 10/09/24 1046   98 °F (36.7 °C) 105 18 114/70 99 %      Temp src Heart Rate Source Patient Position BP Location FiO2 (%)   -- -- -- -- --              Physical Exam  GENERAL: 19-year-old female, alert, no acute distress  SKIN: Warm, dry  HENT: Normocephalic, atraumatic  EYES: no scleral icterus  CV: regular rhythm, regular rate  RESPIRATORY: normal effort, lungs clear  ABDOMEN: Nondistended, mild LUQ TTP, moderate LLQ TTP, normal bowel sounds, no guarding or rigidity  : Chaperoned by MOIRA Gomez  External genitalia is without lesions  The vaginal vault exhibits no erythema, bleeding, or discharge.  The cervical os is closed.  No cervical motion tenderness, uterine tenderness or adnexal tenderness.    MUSCULOSKELETAL: no deformity  NEURO: alert, moves all extremities, follows commands            LAB RESULTS  Labs Reviewed   COMPREHENSIVE METABOLIC PANEL - Abnormal; Notable for the following components:       Result Value    Creatinine 0.55 (*)     All other components within normal limits    Narrative:     GFR Normal >60  Chronic Kidney Disease <60  Kidney Failure <15     URINALYSIS W/ MICROSCOPIC IF INDICATED (NO CULTURE) - Abnormal; Notable for the following components:    Leuk Esterase, UA Trace (*)     All other components within normal limits   CBC WITH AUTO DIFFERENTIAL - Abnormal; Notable for the following  components:    Monocyte % 4.2 (*)     Eosinophil % 0.2 (*)     All other components within normal limits   URINALYSIS, MICROSCOPIC ONLY - Abnormal; Notable for the following components:    WBC, UA 6-10 (*)     Squamous Epithelial Cells, UA 7-12 (*)     All other components within normal limits   WET PREP, GENITAL - Normal   HCG, SERUM, QUALITATIVE - Normal   LIPASE - Normal   CHLAMYDIA TRACHOMATIS, NEISSERIA GONORRHOEAE, PCR   CBC AND DIFFERENTIAL    Narrative:     The following orders were created for panel order CBC & Differential.  Procedure                               Abnormality         Status                     ---------                               -----------         ------                     CBC Auto Differential[[id number]]        Abnormal            Final result                 Please view results for these tests on the individual orders.         RADIOLOGY  US Non-ob Transvaginal, US Pelvis Complete, US Testicular or Ovarian Vascular Limited    Result Date: 10/9/2024  US PELVIS COMPLETE-, US TESTICULAR OR OVARIAN VASCULAR LIMITED-, US NON-OB TRANSVAGINAL-10/9/2024  HISTORY: Left pelvic pain.  Real-time grayscale endovaginal and transabdominal imaging was performed. Doppler and duplex with spectral analysis was performed.  The uterus measures 9.5 cm in length. Double layer endometrial thickness measures 1.7 cm in thickness.  The right ovary measures 3.0 cm in length. Left ovary measures 2.6 cm in length.  There is normal Doppler and duplex signal in both ovaries. There is no evidence of ovarian torsion.  No free fluid is seen.      1. Normal pelvic ultrasound. There is no evidence of ovarian torsion or mass.   This report was finalized on 10/9/2024 3:16 PM by Dr. Alf Perez M.D on Workstation: [id number]         MEDICATIONS GIVEN IN ER  Medications - No data to display      ORDERS PLACED DURING THIS VISIT:  Orders Placed This Encounter   Procedures    Chlamydia trachomatis, Neisseria gonorrhoeae,  PCR - Swab, Cervix    Wet Prep, Genital - Swab, Vagina    US Non-ob Transvaginal    US Pelvis Complete    US Testicular or Ovarian Vascular Limited    Comprehensive Metabolic Panel    hCG, Serum, Qualitative    Urinalysis With Microscopic If Indicated (No Culture) - Urine, Clean Catch    Lipase    CBC Auto Differential    Urinalysis, Microscopic Only - Urine, Clean Catch    CBC & Differential         OUTPATIENT MEDICATION MANAGEMENT:  No current Epic-ordered facility-administered medications on file.     Current Outpatient Medications Ordered in Epic   Medication Sig Dispense Refill    desvenlafaxine (PRISTIQ) 50 MG 24 hr tablet Take 1 tablet by mouth Daily.      doxycycline (VIBRAMYCIN) 100 MG capsule Take 1 capsule by mouth Every 12 (Twelve) Hours.      vitamin D (ERGOCALCIFEROL) 1.25 MG (36577 UT) capsule capsule Take 1 capsule by mouth Every 7 (Seven) Days.           PROCEDURES  Procedures            PROGRESS, DATA ANALYSIS, CONSULTS, AND MEDICAL DECISION MAKING  All labs have been independently interpreted by me.  All radiology studies have been reviewed by me. All EKG's have been independently viewed and interpreted by me.  Discussion below represents my analysis of pertinent findings related to patient's condition, differential diagnosis, treatment plan and final disposition.    DIFFERENTIAL    Differential diagnosis includes but is not limited to:  - hepatobiliary pathology such as cholecystitis, cholangitis, and symptomatic cholelithiasis  - Pancreatitis  - Dyspepsia  - Small bowel obstruction  - Appendicitis  - Diverticulitis  - UTI including pyelonephritis  - Ureteral stone  - Zoster  - Colitis, including infectious and ischemic  - Atypical ACS  - TOA  - ovarian torsion  - PID or cervicitis  - ovarian cyst, including hemorrhagic  - ectopic pregnancy  - pregnancy  - endometriosis  - fibroids      Clinical Scores:                  ED Course as of 10/10/24 1146   Wed Oct 09, 2024   1157 WBC: 5.28 [KA]   1157  Hemoglobin: 14.2 [KA]   1157 Glucose: 71 [KA]   1157 Sodium: 139 [KA]   1157 Creatinine(!): 0.55 [KA]   1157 HCG Qualitative: Negative [KA]   1157 Lipase: 23 [KA]   1157 Bacteria, UA: None Seen [KA]      ED Course User Index  [KA] Calli Wise PA-C     I reassessed the patient, counseled her on all of her lab and imaging results.  Ultrasound is normal, pelvic exam is unremarkable.  No evidence of infectious process.  No cervical motion tenderness.  Labs normal,Vitals normal.  The source for her symptoms is unclear.  I recommended follow-up with PCP and gynecology, can take Tylenol ibuprofen as needed for pain, return precautions discussed.        AS OF 11:46 EDT VITALS:    BP - 114/70  HR - 105  TEMP - 98 °F (36.7 °C)  O2 SATS - 99%    COMPLEXITY OF CARE  Admission was considered but after careful review of the patient's presentation, physical examination, diagnostic results, and response to treatment the patient may be safely discharged with outpatient follow-up.      DIAGNOSIS  Final diagnoses:   Pelvic pain         DISPOSITION  ED Disposition       ED Disposition   Discharge    Condition   Good    Comment   --                  FOLLOW UP  Easton Colon MD  [address]  [facility] [id number] [phone]    Schedule an appointment as soon as possible for a visit in 2 days      Bossman Coelho MD  [address] [place] [address]  [facility] [id number]  [phone]              Prescribed Medications     Medication List      No changes were made to your prescriptions during this visit.                   Please note that portions of this document were completed with a voice recognition program.    Note Disclaimer: At [facility], we believe that sharing information builds trust and better relationships. You are receiving this note because you recently visited [facility]. It is possible you will see health information before a provider has talked with you about it. This kind of  information can be easy to misunderstand. To help you fully understand what it means for your health, we urge you to discuss this note with your provider.         Calli Wise PA-C  10/10/24 114

## 2024-10-09 NOTE — ED PROVIDER NOTES
MD ATTESTATION NOTE     SHARED VISIT: This visit was performed by BOTH a physician and an APC. The substantive portion of the medical decision making was performed by this attesting physician who made or approved the management plan and takes responsibility for patient management. All studies in the APC note (if performed) were independently interpreted by me.  The LETTY and I have discussed this patient's history, physical exam, and treatment plan. I have reviewed the documentation and personally had a face to face interaction with the patient. I affirm the documentation and agree with the treatment and plan. I provided a substantive portion of the care of the patient.  I personally performed the physical exam in its entirety, and below are my findings.      Brief HPI: Patient complains of acute left lower quadrant pain for the past 2 days.  Denies fever, chills, vaginal discharge, nausea, vomiting, dysuria, or abnormal vaginal bleeding.  LMP was about 2.5 weeks ago.  Denies history of ovarian cyst.    PHYSICAL EXAM    GENERAL: Awake, alert, oriented x 3.  No acute distress  HENT: nares patent  EYES: no scleral icterus  CV: regular rhythm, normal rate  RESPIRATORY: normal effort, CTAB  ABDOMEN: soft, mild left lower quadrant tenderness without rebound or guarding, no CVA tenderness  MUSCULOSKELETAL: no deformity  NEURO: alert, moves all extremities, follows commands  PSYCH:  calm, cooperative  SKIN: warm, dry    Vital signs and nursing notes reviewed.        Plan: hCG is negative.  Patient does not have a UTI.  Pelvic ultrasound is pending.      MDM: Patient presented to the ED complaining of acute lower abdominal pain.  She did not have an acute abdomen.  She was afebrile.  Pelvic ultrasound was unremarkable.  She was not pregnant.  She will need to follow-up with her gynecologist.    ED Course as of 10/09/24 1606   Wed Oct 09, 2024   1157 WBC: 5.28 [KA]   1157 Hemoglobin: 14.2 [KA]   1157 Glucose: 71 [KA]   1157  Sodium: 139 [KA]   1157 Creatinine(!): 0.55 [KA]   1157 HCG Qualitative: Negative [KA]   1157 Lipase: 23 [KA]   1157 Bacteria, UA: None Seen [KA]      ED Course User Index  [KA] Calli Wise PA-C Holland, William D, MD  10/09/24 2429

## 2024-10-09 NOTE — ED TRIAGE NOTES
Patient to ED via pv from [facility]. [facility] sent patient for complaints of left sided pelvic pain that began 2 days. Patient denies urinary symptoms.

## 2024-10-11 LAB
C TRACH RRNA SPEC QL NAA+PROBE: NEGATIVE
N GONORRHOEA RRNA SPEC QL NAA+PROBE: NEGATIVE